# Patient Record
Sex: MALE | Race: WHITE | Employment: OTHER | URBAN - NONMETROPOLITAN AREA
[De-identification: names, ages, dates, MRNs, and addresses within clinical notes are randomized per-mention and may not be internally consistent; named-entity substitution may affect disease eponyms.]

---

## 2014-05-10 LAB
ALBUMIN SERPL-MCNC: 4.3 G/DL
ALP BLD-CCNC: 64 U/L
ALT SERPL-CCNC: 25 U/L
ANION GAP SERPL CALCULATED.3IONS-SCNC: NORMAL MMOL/L
AST SERPL-CCNC: 21 U/L
AVERAGE GLUCOSE: NORMAL
BASOPHILS ABSOLUTE: NORMAL
BASOPHILS RELATIVE PERCENT: NORMAL
BILIRUB SERPL-MCNC: 0.9 MG/DL (ref 0.1–1.4)
BUN BLDV-MCNC: 27 MG/DL
CALCIUM SERPL-MCNC: 8.9 MG/DL
CHLORIDE BLD-SCNC: 107 MMOL/L
CHOLESTEROL, TOTAL: 164 MG/DL
CHOLESTEROL/HDL RATIO: 3.7
CO2: 25 MMOL/L
CREAT SERPL-MCNC: 1.2 MG/DL
EOSINOPHILS ABSOLUTE: NORMAL
EOSINOPHILS RELATIVE PERCENT: NORMAL
GFR CALCULATED: >60
GLUCOSE BLD-MCNC: 91 MG/DL
HBA1C MFR BLD: 5.7 %
HCT VFR BLD CALC: 42.3 % (ref 41–53)
HDLC SERPL-MCNC: 44 MG/DL (ref 35–70)
HEMOGLOBIN: 14.1 G/DL (ref 13.5–17.5)
LDL CHOLESTEROL CALCULATED: 89 MG/DL (ref 0–160)
LYMPHOCYTES ABSOLUTE: NORMAL
LYMPHOCYTES RELATIVE PERCENT: NORMAL
MCH RBC QN AUTO: NORMAL PG
MCHC RBC AUTO-ENTMCNC: NORMAL G/DL
MCV RBC AUTO: NORMAL FL
MONOCYTES ABSOLUTE: NORMAL
MONOCYTES RELATIVE PERCENT: NORMAL
NEUTROPHILS ABSOLUTE: NORMAL
NEUTROPHILS RELATIVE PERCENT: NORMAL
NONHDLC SERPL-MCNC: NORMAL MG/DL
PHOSPHORUS: 3.2 MG/DL
PLATELET # BLD: 253 K/ΜL
PMV BLD AUTO: NORMAL FL
POTASSIUM SERPL-SCNC: 4.7 MMOL/L
PROSTATE SPECIFIC ANTIGEN: 1.73 NG/ML
RBC # BLD: 4.15 10^6/ΜL
SODIUM BLD-SCNC: 139 MMOL/L
TOTAL PROTEIN: 6.8
TRIGL SERPL-MCNC: 157 MG/DL
VLDLC SERPL CALC-MCNC: 31 MG/DL
WBC # BLD: 7.4 10^3/ML

## 2015-05-16 LAB
ALBUMIN SERPL-MCNC: 4.6 G/DL
ALP BLD-CCNC: 62 U/L
ALT SERPL-CCNC: 21 U/L
ANION GAP SERPL CALCULATED.3IONS-SCNC: NORMAL MMOL/L
AST SERPL-CCNC: 22 U/L
AVERAGE GLUCOSE: NORMAL
BASOPHILS ABSOLUTE: NORMAL
BASOPHILS RELATIVE PERCENT: NORMAL
BILIRUB SERPL-MCNC: 0.6 MG/DL (ref 0.1–1.4)
BUN BLDV-MCNC: 28 MG/DL
CALCIUM SERPL-MCNC: 9.1 MG/DL
CHLORIDE BLD-SCNC: 105 MMOL/L
CHOLESTEROL, TOTAL: 168 MG/DL
CHOLESTEROL/HDL RATIO: 3.8
CO2: 27 MMOL/L
CREAT SERPL-MCNC: 1.35 MG/DL
EOSINOPHILS ABSOLUTE: NORMAL
EOSINOPHILS RELATIVE PERCENT: NORMAL
GFR CALCULATED: 53
GLUCOSE BLD-MCNC: 91 MG/DL
HBA1C MFR BLD: 5.9 %
HCT VFR BLD CALC: 41.2 % (ref 41–53)
HDLC SERPL-MCNC: 44 MG/DL (ref 35–70)
HEMOGLOBIN: 14.2 G/DL (ref 13.5–17.5)
LDL CHOLESTEROL CALCULATED: 101 MG/DL (ref 0–160)
LYMPHOCYTES ABSOLUTE: NORMAL
LYMPHOCYTES RELATIVE PERCENT: NORMAL
MCH RBC QN AUTO: NORMAL PG
MCHC RBC AUTO-ENTMCNC: NORMAL G/DL
MCV RBC AUTO: NORMAL FL
MONOCYTES ABSOLUTE: NORMAL
MONOCYTES RELATIVE PERCENT: NORMAL
NEUTROPHILS ABSOLUTE: NORMAL
NEUTROPHILS RELATIVE PERCENT: NORMAL
NONHDLC SERPL-MCNC: NORMAL MG/DL
PHOSPHORUS: 3.4 MG/DL
PLATELET # BLD: 238 K/ΜL
PMV BLD AUTO: NORMAL FL
POTASSIUM SERPL-SCNC: 4.3 MMOL/L
PROSTATE SPECIFIC ANTIGEN: 2.03 NG/ML
RBC # BLD: 4.02 10^6/ΜL
SODIUM BLD-SCNC: 139 MMOL/L
TOTAL PROTEIN: 6.3
TRIGL SERPL-MCNC: 113 MG/DL
VLDLC SERPL CALC-MCNC: 22 MG/DL
WBC # BLD: 7.6 10^3/ML

## 2016-05-14 LAB
ALBUMIN SERPL-MCNC: 4.3 G/DL
ALP BLD-CCNC: 61 U/L
ALT SERPL-CCNC: 23 U/L
ANION GAP SERPL CALCULATED.3IONS-SCNC: NORMAL MMOL/L
AST SERPL-CCNC: 24 U/L
AVERAGE GLUCOSE: NORMAL
BASOPHILS ABSOLUTE: NORMAL
BASOPHILS RELATIVE PERCENT: NORMAL
BILIRUB SERPL-MCNC: 0.7 MG/DL (ref 0.1–1.4)
BUN BLDV-MCNC: 25 MG/DL
CALCIUM SERPL-MCNC: 9.2 MG/DL
CHLORIDE BLD-SCNC: 108 MMOL/L
CHOLESTEROL, TOTAL: 158 MG/DL
CHOLESTEROL/HDL RATIO: 3.5
CO2: 26 MMOL/L
CREAT SERPL-MCNC: 1.21 MG/DL
EOSINOPHILS ABSOLUTE: NORMAL
EOSINOPHILS RELATIVE PERCENT: NORMAL
GFR CALCULATED: 59
GLUCOSE BLD-MCNC: 99 MG/DL
HBA1C MFR BLD: 5.7 %
HCT VFR BLD CALC: 43.7 % (ref 41–53)
HDLC SERPL-MCNC: 44 MG/DL (ref 35–70)
HEMOGLOBIN: 14.3 G/DL (ref 13.5–17.5)
LDL CHOLESTEROL CALCULATED: 93 MG/DL (ref 0–160)
LYMPHOCYTES ABSOLUTE: NORMAL
LYMPHOCYTES RELATIVE PERCENT: NORMAL
MCH RBC QN AUTO: NORMAL PG
MCHC RBC AUTO-ENTMCNC: NORMAL G/DL
MCV RBC AUTO: NORMAL FL
MONOCYTES ABSOLUTE: NORMAL
MONOCYTES RELATIVE PERCENT: NORMAL
NEUTROPHILS ABSOLUTE: NORMAL
NEUTROPHILS RELATIVE PERCENT: NORMAL
NONHDLC SERPL-MCNC: NORMAL MG/DL
PHOSPHORUS: 3.3 MG/DL
PLATELET # BLD: 276 K/ΜL
PMV BLD AUTO: NORMAL FL
POTASSIUM SERPL-SCNC: 4.3 MMOL/L
PROSTATE SPECIFIC ANTIGEN: 1.21 NG/ML
RBC # BLD: 4.23 10^6/ΜL
SODIUM BLD-SCNC: 139 MMOL/L
TOTAL PROTEIN: 6.7
TRIGL SERPL-MCNC: 105 MG/DL
VLDLC SERPL CALC-MCNC: 21 MG/DL
WBC # BLD: 6.3 10^3/ML

## 2017-08-29 LAB
BASOPHILS ABSOLUTE: ABNORMAL
BASOPHILS RELATIVE PERCENT: ABNORMAL
BUN BLDV-MCNC: 21 MG/DL
CALCIUM SERPL-MCNC: 8.8 MG/DL
CHLORIDE BLD-SCNC: 105 MMOL/L
CO2: 28 MMOL/L
CREAT SERPL-MCNC: 1.33 MG/DL
EOSINOPHILS ABSOLUTE: ABNORMAL
EOSINOPHILS RELATIVE PERCENT: ABNORMAL
GFR CALCULATED: 53
GLUCOSE BLD-MCNC: 103 MG/DL
HCT VFR BLD CALC: 40.7 % (ref 41–53)
HEMOGLOBIN: 13.3 G/DL (ref 13.5–17.5)
LYMPHOCYTES ABSOLUTE: ABNORMAL
LYMPHOCYTES RELATIVE PERCENT: ABNORMAL
MCH RBC QN AUTO: ABNORMAL PG
MCHC RBC AUTO-ENTMCNC: ABNORMAL G/DL
MCV RBC AUTO: ABNORMAL FL
MONOCYTES ABSOLUTE: ABNORMAL
MONOCYTES RELATIVE PERCENT: ABNORMAL
NEUTROPHILS ABSOLUTE: ABNORMAL
NEUTROPHILS RELATIVE PERCENT: ABNORMAL
PLATELET # BLD: 174 K/ΜL
PMV BLD AUTO: ABNORMAL FL
POTASSIUM SERPL-SCNC: 4.4 MMOL/L
RBC # BLD: 4.06 10^6/ΜL
SODIUM BLD-SCNC: 137 MMOL/L
WBC # BLD: 7.7 10^3/ML

## 2018-09-05 ENCOUNTER — HOSPITAL ENCOUNTER (OUTPATIENT)
Dept: AUDIOLOGY | Age: 73
Discharge: HOME | End: 2018-09-05
Payer: MEDICARE

## 2018-09-05 PROCEDURE — 92557 COMPREHENSIVE HEARING TEST: CPT | Performed by: AUDIOLOGIST

## 2018-09-05 PROCEDURE — 92567 TYMPANOMETRY: CPT | Performed by: AUDIOLOGIST

## 2018-09-05 NOTE — LETTER
[]   [] Non-Patent PE tube  []   [] Could Not Test    COMMENTS:  Audiometric results indicate a moderate sloping to profound sensorineural hearing loss bilaterally. Word recognition is fair bilaterally. Tympanometry is WNL for both ears. RECOMMENDATION(S):   1. Patient to follow up with Dr. Mona Early. 2.  Patient receives hearing aid services through [facility]. Recommend annual audiometric testing and hearing aid checks. Kathe Cabot, M.S. James Mahoney.   Audiologist

## 2018-09-05 NOTE — PROGRESS NOTES
ACCOUNT #: [de-identified]      AUDIOLOGICAL EVALUATION      REASON FOR TESTING:   Patient wears binaural Rafa DAYNA hearing aids. He has experienced hearing loss for many years. Patient reported that his hearing recently declined in his right ear. He had his hearing aids adjusted by [facility]. Patient was a  and was exposed to loud road and wind noise for many years. Patient is also scheduled for imaging. OTOSCOPY: WNL     AUDIOGRAM        Reliability: good  Audiometer Used:  GSI-61    PURE TONES     RE    LE     []   [] WNL        []   [] Mild    []   [] Moderate       [x]   [x] Mod-Severe   []   [] Severe    []   [] Profound    TYPE     RE    LE    [x]   [x] SNHL    []   []  Conductive HL    []   [] Mixed HL      CONFIGURATION    RE    LE    []   [] Essentially Flat     [x]   [x]  Sloping  []   [] Steeply Sloping  []   []  Rising  []   [] Cookie Bite    SPEECH AUDIOMETRY   Right Left Sound Field Aided   PTA 72 72     SRT 65 60     SAT       MASKING       % WRS   QUIET 72 72      20 SL 20 SL     %WRS   NOISE              [facility]            Live Voice  [x]     Recorded  []     List   []     WORD RECOGNITION   RE    LE  []   []  Excellent    []   []  Good  [x]   [x] Fair  []   [] Poor  []   [] Very Poor    TYMPANOGRAMS  RE    LE  [x]   [x]  WNL    []   []  WNL w/reduced mobility  []   [] WNL w/hyper mobility  []   [] Negative pressure  []   [] Flat w/normal ECV  []   [] Flat w/large ECV  []   [] Patent PE tube  []   [] Non-Patent PE tube  []   [] Could Not Test    COMMENTS:  Audiometric results indicate a moderate sloping to profound sensorineural hearing loss bilaterally. Word recognition is fair bilaterally. Tympanometry is WNL for both ears. RECOMMENDATION(S):   1. Patient to follow up with Dr. Teresa Amaro. 2.  Patient receives hearing aid services through [facility]. Recommend annual audiometric testing and hearing aid checks.

## 2018-09-07 ENCOUNTER — HOSPITAL ENCOUNTER (OUTPATIENT)
Dept: MRI IMAGING | Age: 73
Discharge: HOME | End: 2018-09-07
Payer: MEDICARE

## 2018-09-07 DIAGNOSIS — H91.8X9 ASYMMETRICAL HEARING LOSS, UNSPECIFIED LATERALITY: ICD-10-CM

## 2018-09-07 LAB
CREATININE, WHOLE BLOOD: 1.5 MG/DL (ref 0.5–1.2)
ESTIMATED GFR, PCACC: 49 ML/MIN/1.73M2

## 2018-09-07 PROCEDURE — A9579 GAD-BASE MR CONTRAST NOS,1ML: HCPCS | Performed by: PHYSICIAN ASSISTANT

## 2018-09-07 PROCEDURE — 70553 MRI BRAIN STEM W/O & W/DYE: CPT

## 2018-09-07 PROCEDURE — 82565 ASSAY OF CREATININE: CPT

## 2018-09-07 PROCEDURE — 6360000004 HC RX CONTRAST MEDICATION: Performed by: PHYSICIAN ASSISTANT

## 2018-09-07 RX ADMIN — GADOTERIDOL 20 ML: 279.3 INJECTION, SOLUTION INTRAVENOUS at 13:25

## 2020-06-18 LAB
ALBUMIN SERPL-MCNC: 3.8 G/DL
ALP BLD-CCNC: NORMAL U/L
ALT SERPL-CCNC: 17 U/L
ANION GAP SERPL CALCULATED.3IONS-SCNC: 3 MMOL/L
AST SERPL-CCNC: 19 U/L
BASOPHILS ABSOLUTE: ABNORMAL
BASOPHILS RELATIVE PERCENT: ABNORMAL
BILIRUB SERPL-MCNC: 0.6 MG/DL (ref 0.1–1.4)
BUN BLDV-MCNC: 23 MG/DL
CALCIUM SERPL-MCNC: 9.2 MG/DL
CHLORIDE BLD-SCNC: 107 MMOL/L
CHOLESTEROL, TOTAL: 145 MG/DL
CHOLESTEROL/HDL RATIO: 4
CO2: 27 MMOL/L
CREAT SERPL-MCNC: 1.2 MG/DL
EOSINOPHILS ABSOLUTE: ABNORMAL
EOSINOPHILS RELATIVE PERCENT: ABNORMAL
GFR CALCULATED: 59
GLUCOSE BLD-MCNC: 83 MG/DL
HCT VFR BLD CALC: 40.5 % (ref 41–53)
HDLC SERPL-MCNC: 36 MG/DL (ref 35–70)
HEMOGLOBIN: 13.5 G/DL (ref 13.5–17.5)
LDL CHOLESTEROL CALCULATED: 75 MG/DL (ref 0–160)
LYMPHOCYTES ABSOLUTE: ABNORMAL
LYMPHOCYTES RELATIVE PERCENT: ABNORMAL
MCH RBC QN AUTO: ABNORMAL PG
MCHC RBC AUTO-ENTMCNC: ABNORMAL G/DL
MCV RBC AUTO: ABNORMAL FL
MONOCYTES ABSOLUTE: ABNORMAL
MONOCYTES RELATIVE PERCENT: ABNORMAL
NEUTROPHILS ABSOLUTE: ABNORMAL
NEUTROPHILS RELATIVE PERCENT: ABNORMAL
NONHDLC SERPL-MCNC: NORMAL MG/DL
PLATELET # BLD: 230 K/ΜL
PMV BLD AUTO: ABNORMAL FL
POTASSIUM SERPL-SCNC: 4.2 MMOL/L
RBC # BLD: 3.99 10^6/ΜL
SODIUM BLD-SCNC: 137 MMOL/L
TOTAL PROTEIN: 6.2
TRIGL SERPL-MCNC: 172 MG/DL
VLDLC SERPL CALC-MCNC: 34 MG/DL
WBC # BLD: 11.1 10^3/ML

## 2020-07-28 LAB
ALBUMIN SERPL-MCNC: 4.1 G/DL
ALP BLD-CCNC: NORMAL U/L
ALT SERPL-CCNC: 15 U/L
ANION GAP SERPL CALCULATED.3IONS-SCNC: 5 MMOL/L
AST SERPL-CCNC: 20 U/L
BASOPHILS ABSOLUTE: ABNORMAL
BASOPHILS RELATIVE PERCENT: ABNORMAL
BILIRUB SERPL-MCNC: 0.5 MG/DL (ref 0.1–1.4)
BUN BLDV-MCNC: 26 MG/DL
CALCIUM SERPL-MCNC: 8.7 MG/DL
CHLORIDE BLD-SCNC: 108 MMOL/L
CHOLESTEROL, TOTAL: 151 MG/DL
CHOLESTEROL/HDL RATIO: 4.3
CO2: 26 MMOL/L
CREAT SERPL-MCNC: 1.4 MG/DL
EOSINOPHILS ABSOLUTE: ABNORMAL
EOSINOPHILS RELATIVE PERCENT: ABNORMAL
GFR CALCULATED: 50
GLUCOSE BLD-MCNC: 98 MG/DL
HCT VFR BLD CALC: 40.3 % (ref 41–53)
HDLC SERPL-MCNC: 35 MG/DL (ref 35–70)
HEMOGLOBIN: 13.6 G/DL (ref 13.5–17.5)
LDL CHOLESTEROL CALCULATED: 73 MG/DL (ref 0–160)
LYMPHOCYTES ABSOLUTE: ABNORMAL
LYMPHOCYTES RELATIVE PERCENT: ABNORMAL
MCH RBC QN AUTO: ABNORMAL PG
MCHC RBC AUTO-ENTMCNC: ABNORMAL G/DL
MCV RBC AUTO: ABNORMAL FL
MONOCYTES ABSOLUTE: ABNORMAL
MONOCYTES RELATIVE PERCENT: ABNORMAL
NEUTROPHILS ABSOLUTE: ABNORMAL
NEUTROPHILS RELATIVE PERCENT: ABNORMAL
NONHDLC SERPL-MCNC: NORMAL MG/DL
PLATELET # BLD: 225 K/ΜL
PMV BLD AUTO: ABNORMAL FL
POTASSIUM SERPL-SCNC: 4.6 MMOL/L
RBC # BLD: 3.97 10^6/ΜL
SODIUM BLD-SCNC: 139 MMOL/L
TOTAL PROTEIN: 6.5
TRIGL SERPL-MCNC: 162 MG/DL
VLDLC SERPL CALC-MCNC: 32 MG/DL
WBC # BLD: 10.2 10^3/ML

## 2020-09-10 RX ORDER — SIMVASTATIN 20 MG
20 TABLET ORAL NIGHTLY
COMMUNITY

## 2020-09-10 RX ORDER — OLMESARTAN MEDOXOMIL 20 MG/1
20 TABLET ORAL DAILY
COMMUNITY

## 2020-09-14 LAB
AVERAGE GLUCOSE: NORMAL
BUN BLDV-MCNC: 30 MG/DL
CALCIUM SERPL-MCNC: 9 MG/DL
CHLORIDE BLD-SCNC: 108 MMOL/L
CO2: 26 MMOL/L
CREAT SERPL-MCNC: 1.45 MG/DL
GFR CALCULATED: 48
GLUCOSE BLD-MCNC: 94 MG/DL
HBA1C MFR BLD: 6 %
POTASSIUM SERPL-SCNC: 4.9 MMOL/L
PROSTATE SPECIFIC ANTIGEN: 2.28 NG/ML
SODIUM BLD-SCNC: 139 MMOL/L

## 2020-09-18 ENCOUNTER — HOSPITAL ENCOUNTER (OUTPATIENT)
Age: 75
Setting detail: OUTPATIENT SURGERY
Discharge: HOME | End: 2020-09-18
Attending: SPECIALIST | Admitting: SPECIALIST
Payer: MEDICARE

## 2020-09-18 VITALS
RESPIRATION RATE: 16 BRPM | HEIGHT: 72 IN | TEMPERATURE: 97.5 F | OXYGEN SATURATION: 98 % | DIASTOLIC BLOOD PRESSURE: 63 MMHG | SYSTOLIC BLOOD PRESSURE: 132 MMHG | BODY MASS INDEX: 25.98 KG/M2 | HEART RATE: 55 BPM | WEIGHT: 191.8 LBS

## 2020-09-18 PROCEDURE — 2709999900 HC NON-CHARGEABLE SUPPLY: Performed by: SPECIALIST

## 2020-09-18 PROCEDURE — 2500000003 HC RX 250 WO HCPCS: Performed by: SPECIALIST

## 2020-09-18 PROCEDURE — 88305 TISSUE EXAM BY PATHOLOGIST: CPT

## 2020-09-18 PROCEDURE — 3600000012 HC SURGERY LEVEL 2 ADDTL 15MIN: Performed by: SPECIALIST

## 2020-09-18 PROCEDURE — 3600000002 HC SURGERY LEVEL 2 BASE: Performed by: SPECIALIST

## 2020-09-18 PROCEDURE — 88331 PATH CONSLTJ SURG 1 BLK 1SPC: CPT

## 2020-09-18 PROCEDURE — 7100000010 HC PHASE II RECOVERY - FIRST 15 MIN: Performed by: SPECIALIST

## 2020-09-18 PROCEDURE — 7100000011 HC PHASE II RECOVERY - ADDTL 15 MIN: Performed by: SPECIALIST

## 2020-09-18 RX ORDER — CEFADROXIL 500 MG/1
500 CAPSULE ORAL 2 TIMES DAILY
COMMUNITY
End: 2020-09-24

## 2020-09-18 RX ORDER — LIDOCAINE HYDROCHLORIDE AND EPINEPHRINE 10; 10 MG/ML; UG/ML
INJECTION, SOLUTION INFILTRATION; PERINEURAL PRN
Status: DISCONTINUED | OUTPATIENT
Start: 2020-09-18 | End: 2020-09-18 | Stop reason: ALTCHOICE

## 2020-09-18 ASSESSMENT — PAIN - FUNCTIONAL ASSESSMENT: PAIN_FUNCTIONAL_ASSESSMENT: 0-10

## 2020-09-18 ASSESSMENT — PAIN SCALES - GENERAL: PAINLEVEL_OUTOF10: 0

## 2020-09-18 NOTE — PROGRESS NOTES
1244: Patient to phase 2 recovery room via transport chair. Patient is awake and alert. Report received from surgical RN, Corey Umaña. Patient's vitals obtained, see charting. 1246: Patient is denying pain and nausea at this time. Dressing to his lower back is clean, dry and intact- see LDA. 1248: Offered water given. Patient is getting dressed at this time. Wife is at the bedside. 1259: Discharge instructions given and explained to the patient and the patient's wife, both verbalized understanding. 1301: Patient ambulated to the car and discharged home in stable condition with his wife.

## 2020-09-18 NOTE — H&P
[address] [address]  History and Physical Update    Pt Name: Juan C Ray  MRN: [id number]  YOB: 1945  Date of evaluation: 9/18/2020    I have examined the patient and reviewed the H&P/Consult and there are no changes to the patient or plans.       Raul Larios  Electronically signed 9/18/2020 at 6:54 AM

## 2020-09-18 NOTE — PROGRESS NOTES
[address], [facility], 4X4 GAUZE & FOAM TAPE APPLIED TO LOWER BACK INCISION.

## 2020-09-18 NOTE — OP NOTE
Operative Note    Patient name: Jayson Chopra             Medical Record Number: [id number]    Primary Care Physician: Zaki Campbell     1945    Date of Procedure: 2020    Pre-operative Diagnosis: 1.5cm left lower back basal cell carcinoma    Post-operative Diagnosis: Same    Procedure Performed: 3cm excision of left lower back basal cell carcinoma with 5cm complex closure (CPT 22074 & 44087)    Surgeons/Assistants: Dr. Hunter Thompson PA-C    Estimated Blood Loss: 3ml     Complications: none immediately appreciated    Procedure: With the patient lying in the prone position after having anesthetized the area with a total of 19 ml of 1% Lidocaine 1:100,000 with epinephrine solution, the area was then prepped  draped in the standard surgical fashion. The skin cancer was excised and sent for fresh frozen evaluation. Pathology called back to the room and stated indeed the margin were clear. This was extensively undermined due to tension and then closed in layers with 0 PDS, 3-0 Monocryl suture placed in interrupted buried fashion. Final layer was skin staples, bacitracin and bulky sterile dressings. The patient tolerated the procedure quite well and remained hemodynamically stable throughout the procedure and was quite comfortable throughout the operative course.     Clinical staging for cancer cases:  Ct  Cn  Cm    María Elena Looney  Electronically signed by me on 2020 at 12:38 PMOperative Note      Patient: Jayson Chopra  YOB: 1945  MRN: [id number]    Date of Procedure: 2020    Pre-Op Diagnosis: BCC LEFT LOWER BACK    Post-Op Diagnosis: Same       Procedure(s):  EXCISION BCC LEFT LOWER BACK WITH FROZEN SECTION    Surgeon(s):  María Elena Looney MD    Assistant:   Physician Assistant: Karina Jameson PA-C    Anesthesia: Local    Estimated Blood Loss (mL): Minimal    Complications: None    Specimens:   ID Type Source Tests Collected by Time Destination   A : BCC LEFT LOWER BACK, SOLID TYPE, PRESENT IN EDGE OF BIOPSY, SHORT STITCH SUPERIOR, LONG STITCH LATERAL Tissue Back SURGICAL PATHOLOGY Neftaly Rubio MD 9/18/2020 1137        Implants:  * No implants in log *      Drains: * No LDAs found *    Findings: 1.5cm left lower back basal cell carcinoma      Detailed Description of Procedure:   3cm excision of left lower back basal cell carcinoma with 5cm complex closure (CPT 07291 & 08274)      Electronically signed by Neftaly Rubio MD on 9/18/2020 at 12:35 PM

## 2020-10-13 ENCOUNTER — OFFICE VISIT (OUTPATIENT)
Dept: NEPHROLOGY | Age: 75
End: 2020-10-13
Payer: MEDICARE

## 2020-10-13 VITALS
WEIGHT: 197 LBS | DIASTOLIC BLOOD PRESSURE: 64 MMHG | OXYGEN SATURATION: 97 % | SYSTOLIC BLOOD PRESSURE: 133 MMHG | HEART RATE: 67 BPM | BODY MASS INDEX: 26.72 KG/M2

## 2020-10-13 PROCEDURE — G8427 DOCREV CUR MEDS BY ELIG CLIN: HCPCS | Performed by: INTERNAL MEDICINE

## 2020-10-13 PROCEDURE — 1123F ACP DISCUSS/DSCN MKR DOCD: CPT | Performed by: INTERNAL MEDICINE

## 2020-10-13 PROCEDURE — G8484 FLU IMMUNIZE NO ADMIN: HCPCS | Performed by: INTERNAL MEDICINE

## 2020-10-13 PROCEDURE — 4040F PNEUMOC VAC/ADMIN/RCVD: CPT | Performed by: INTERNAL MEDICINE

## 2020-10-13 PROCEDURE — G8419 CALC BMI OUT NRM PARAM NOF/U: HCPCS | Performed by: INTERNAL MEDICINE

## 2020-10-13 PROCEDURE — 3017F COLORECTAL CA SCREEN DOC REV: CPT | Performed by: INTERNAL MEDICINE

## 2020-10-13 PROCEDURE — 1036F TOBACCO NON-USER: CPT | Performed by: INTERNAL MEDICINE

## 2020-10-13 PROCEDURE — 99204 OFFICE O/P NEW MOD 45 MIN: CPT | Performed by: INTERNAL MEDICINE

## 2020-10-13 NOTE — PATIENT INSTRUCTIONS
Drugs to Avoid with Chronic Kidney Disease    Non-steroidal anti-inflammatory drugs (NSAIDS)    Potential complication and side effects of NSAIDS include:   Kidney injury and worsening kidney function    Risk of stomach ulcer and intestinal bleeding   Fluid retention and edema   Increased Blood Pressure    Non-Steroidal Anti-Inflammatory Drugs     Aspirin (Anacin, Ascriptin, eliezer, Bufferin, Ecotrin, Excedrin)   Celecoxib (Celebrex)   Choline and magnesium salicylates (CMT, Trisosal, Trilisate)   Choline salicylate (Arthropan)   Diclofenac (Voltaren, Arthrotec, Cataflam, Cambia, Voltaren-XR, Zipsor)   Diflunisal (Dolobid)   Etodolac (Lodine XL, Lodine)   Famotidine + Ibuprofen (Duexis)   Fenoprofen (Nalfon, Nalfon 200)   Furbiprofen (Asaid)   Ibuprofen (Advil, Motrin, Midol, Nuprin, Genpril, Dolgesic,Profen,, Vicoprofen,Combunox, Actiprofen, Addaprin, Caldolor, Haltran, Q-Profen,Ibren, Menadol, Rufen,Saleto-200, [place],Ultraprin, Uni-Pro,Wal-Profen)   Indomethacin (Indocin, Indomethegan, Indo-Sid)    Ketoprofen (Orudis  KT, Oruvail, Actron)   Ketorolac (Toradol, Sprix)   Magnesium Sulfate (Arthritab, Eliezer Select, Nawaf's pills, Ameya, Mobidin, Mobogesic)   Meclofenamate Sodium (Ponstel)   Meloxicam (Mobic)   Naproxen (Aleve, Anaprox, Naprosyn, EC-Naprosyn, Naprelan, All Day Pain Relief, Aflaxen, Anaprox-DS, Midol Extended Relief, Naprelan,Prevacid NapraPac, Naprapac, Vimovo)   Nabumetone (Relafen)   Oxaprozin (Daypro)   Piroxicam (Feldene)   Rofecoxib (Vioxx)   Salsalate (Amigesic, Anaflex 750, Disalcid, Marthritic, Mono-gesic, Salflex, Salsitab)   Sodium salicylate (various generics)   Sulindac (Clinoril)   Tolmetin (Tolectin)   Valdecoxib (Bextra)   Mefenamic Acid (Ponstel)   Famotidine and Ibuprofen (Duexis)   Meclofenamate (Meclomen)    Antibiotics   Bactrim   Gentamycin   Tobramycin   Vancomycin   Tetracycline   Macrobid     Other                  IV contrast dye

## 2020-10-13 NOTE — PROGRESS NOTES
Nephrology Consult Note  Patient's Name: Sanaz Caballero  8:25 AM  10/13/2020    Nephrologist: Rahat Bender    Reason for Consult: Chronic kidney disease  Requesting Physician:  No att. providers found  PCP: Av Duron    Chief Complaint: My kidney numbers are high  Assessment   Diagnosis Orders   1. Stage 3b chronic kidney disease  Basic Metabolic Panel    Vitamin D 25 Hydroxy    PTH, Intact    Protein / creatinine ratio, urine    US RENAL LIMITED   2. Essential hypertension     3. Dyslipidemia           Plan    1. I discussed my thoughts with the patient and spouse. 2. They understood. 3. Addressed their questions. 4. He likely has chronic kidney disease due to hypertensive nephrosclerosis. However, differential diagnosis will include obstructive uropathy from BPH due to his advanced age. I doubt this is the case. 5. We will get baseline kidney ultrasound to also rule out hydronephrosis  6. Check markers of chronicity  7. Random urine protein creatinine ratio  8. Avoid nonsteroidal anti-inflammatory drugs. 9. List provided to the patient from the office. 10. Return visit in 4 weeks with labs. History Obtained From:  Patient,staff and medical records  History of Present Ilness:    Sanaz Caballreo is a 69 y.o. male with history of hypertension, dyslipidemia who was referred to our office because of elevated  serum creatinine level. In May 2015, serum creatinine was 1.35 mg/L. In August 2017 it was 1.33 mg/dL. In July 2020 serum creatinine was 1.40 mg/L. Repeat serum creatinine was September 14, 2020 increased to 1.45 mg/dL. As a result he was asked to see us. No difficulties with urination. He does report urinary frequency however without burning. He denies taking any nonsteroidal anti-inflammatory medications. 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 We cannot confirm that with any diagnostic studies as there is none on the record.   He was followed by Dr. Polina Randolph many years ago before he retired. Denies chest pain,shortness of breath,fever,chills,nausea or vomiting     Past Medical History:   Diagnosis Date    BPH (benign prostatic hyperplasia)     Hyperlipidemia     Hyperlipidemia     Hypertension        Past Surgical History:   Procedure Laterality Date    BACK SURGERY Left 9/18/2020    EXCISION BCC LEFT LOWER BACK WITH FROZEN SECTION performed by Antonio Eastman MD at [address] [place] [address]         History reviewed. No pertinent family history. reports that he has never smoked. He has never used smokeless tobacco. He reports previous alcohol use. He reports that he does not use drugs. Allergies:  Patient has no known allergies. Current Medications:    Current Outpatient Medications   Medication Sig Dispense Refill    simvastatin (ZOCOR) 20 MG tablet Take 20 mg by mouth nightly      olmesartan (BENICAR) 20 MG tablet Take 20 mg by mouth daily       No current facility-administered medications for this visit. No current facility-administered medications for this visit. Review of Systems:   Pertinent positives stated above in HPI. All other systems were reviewed and were negative. ROS:Constitutional: negative  Eyes: negative  Ears, nose, mouth, throat, and face: negative  Respiratory: negative  Cardiovascular: negative  Gastrointestinal: negative  Genitourinary:negative  Integument/breast: negative  Hematologic/lymphatic: negative  Musculoskeletal:negative  Neurological: negative  Behavioral/Psych: negative  Endocrine: negative  Allergic/Immunologic: negative    Physical exam:   Constitutional: Well-developed elderly gentleman in no obvious distress. Vitals:   Vitals:    10/13/20 0822   BP: 133/64   Pulse: 67   SpO2: 97%       Skin: no rash, turgor is normal  Heent: Pupils are reactive to light. Throat is clear.   Oral mucosa is moist.  Neck: no bruits or jvd noted **  Cardiovascular:  S1, S2 without murmur

## 2020-10-21 ENCOUNTER — TELEPHONE (OUTPATIENT)
Dept: NEPHROLOGY | Age: 75
End: 2020-10-21

## 2020-10-21 ENCOUNTER — HOSPITAL ENCOUNTER (OUTPATIENT)
Dept: ULTRASOUND IMAGING | Age: 75
Discharge: HOME | End: 2020-10-21
Payer: MEDICARE

## 2020-10-21 PROCEDURE — 76770 US EXAM ABDO BACK WALL COMP: CPT

## 2020-10-21 NOTE — TELEPHONE ENCOUNTER
----- Message from Sahra Pritchard MD sent at 10/21/2020  4:43 PM EDT -----  Only one kidney seen the right kidney which is normal otherwise

## 2020-11-05 ENCOUNTER — HOSPITAL ENCOUNTER (OUTPATIENT)
Age: 75
Discharge: HOME | End: 2020-11-05
Payer: MEDICARE

## 2020-11-05 DIAGNOSIS — N18.32 STAGE 3B CHRONIC KIDNEY DISEASE (HCC): ICD-10-CM

## 2020-11-05 LAB
ANION GAP SERPL CALCULATED.3IONS-SCNC: 7 MEQ/L (ref 8–16)
BUN BLDV-MCNC: 27 MG/DL (ref 7–22)
CALCIUM SERPL-MCNC: 9.6 MG/DL (ref 8.5–10.5)
CHLORIDE BLD-SCNC: 105 MEQ/L (ref 98–111)
CO2: 29 MEQ/L (ref 23–33)
CREAT SERPL-MCNC: 1.4 MG/DL (ref 0.4–1.2)
CREATININE URINE: 109.2 MG/DL
GFR SERPL CREATININE-BSD FRML MDRD: 50 ML/MIN/1.73M2
GLUCOSE BLD-MCNC: 100 MG/DL (ref 70–108)
POTASSIUM SERPL-SCNC: 4.8 MEQ/L (ref 3.5–5.2)
PROT/CREAT RATIO, UR: 0.07
PROTEIN, URINE: 7.7 MG/DL
PTH INTACT: 34.3 PG/ML (ref 15–65)
SODIUM BLD-SCNC: 141 MEQ/L (ref 135–145)
VITAMIN D 25-HYDROXY: 35 NG/ML (ref 30–100)

## 2020-11-05 PROCEDURE — 36415 COLL VENOUS BLD VENIPUNCTURE: CPT

## 2020-11-05 PROCEDURE — 82570 ASSAY OF URINE CREATININE: CPT

## 2020-11-05 PROCEDURE — 84156 ASSAY OF PROTEIN URINE: CPT

## 2020-11-05 PROCEDURE — 82306 VITAMIN D 25 HYDROXY: CPT

## 2020-11-05 PROCEDURE — 80048 BASIC METABOLIC PNL TOTAL CA: CPT

## 2020-11-05 PROCEDURE — 83970 ASSAY OF PARATHORMONE: CPT

## 2020-11-11 ENCOUNTER — OFFICE VISIT (OUTPATIENT)
Dept: NEPHROLOGY | Age: 75
End: 2020-11-11
Payer: MEDICARE

## 2020-11-11 VITALS
WEIGHT: 195 LBS | TEMPERATURE: 97.7 F | DIASTOLIC BLOOD PRESSURE: 78 MMHG | HEART RATE: 59 BPM | BODY MASS INDEX: 26.45 KG/M2 | SYSTOLIC BLOOD PRESSURE: 132 MMHG | OXYGEN SATURATION: 98 %

## 2020-11-11 PROCEDURE — 4040F PNEUMOC VAC/ADMIN/RCVD: CPT | Performed by: INTERNAL MEDICINE

## 2020-11-11 PROCEDURE — 3017F COLORECTAL CA SCREEN DOC REV: CPT | Performed by: INTERNAL MEDICINE

## 2020-11-11 PROCEDURE — G8417 CALC BMI ABV UP PARAM F/U: HCPCS | Performed by: INTERNAL MEDICINE

## 2020-11-11 PROCEDURE — 99213 OFFICE O/P EST LOW 20 MIN: CPT | Performed by: INTERNAL MEDICINE

## 2020-11-11 PROCEDURE — G8427 DOCREV CUR MEDS BY ELIG CLIN: HCPCS | Performed by: INTERNAL MEDICINE

## 2020-11-11 PROCEDURE — 1036F TOBACCO NON-USER: CPT | Performed by: INTERNAL MEDICINE

## 2020-11-11 PROCEDURE — 1123F ACP DISCUSS/DSCN MKR DOCD: CPT | Performed by: INTERNAL MEDICINE

## 2020-11-11 PROCEDURE — G8484 FLU IMMUNIZE NO ADMIN: HCPCS | Performed by: INTERNAL MEDICINE

## 2020-11-11 NOTE — PROGRESS NOTES
Renal Progress Note    Assessment and Plan:      Diagnosis Orders   1. Stage 3b chronic kidney disease     2. Essential hypertension     3. Solitary right kidney     4. Acquired renal cyst of right kidney     PLAN:  Discussed lab results with patient and spouse. They understood. I addressed their questions. Serum creatinine is slightly improved to 1.4 mg/dL from 1.45 mg/dL. PTH is normal.  Vitamin D level is normal.  Random urine protein creatinine ratio is normal at 70 mg. Kidney ultrasound shows a solitary right kidney with compensatory hypertrophy. This was discussed with him. Medications reviewed. No changes. I did offer to give him medication for nocturnal frequency but he declined. Return visit in 6 months with labs. There is no problem list on file for this patient. Subjective:   Chief complaint:  Chief Complaint   Patient presents with    Chronic Kidney Disease     Stage IIIB      HPI:This is a follow up visit for Mr. Jesse Russo who is here today for return appointment. .  I saw him about 4 weeks ago for elevated serum creatinine level. Doing well with no complaint. No chest pain or shortness of breath. No fever or chills. No nausea vomiting. No headaches. No abdominal pain. Appetite is good. The wife complains of nocturnal frequency.   Patient himself does not think it is that bad    ROS:Constitutional: negative  Eyes: negative  Ears, nose, mouth, throat, and face: negative  Respiratory: negative  Cardiovascular: negative  Gastrointestinal: negative  Genitourinary:positive for frequency  Integument/breast: negative  Hematologic/lymphatic: negative  Musculoskeletal:negative  Neurological: negative  Behavioral/Psych: negative  Endocrine: negative  Allergic/Immunologic: negative      Medications:     Current Outpatient Medications   Medication Sig Dispense Refill    simvastatin (ZOCOR) 20 MG tablet Take 20 mg by mouth nightly      olmesartan (BENICAR) 20 MG tablet Take 20 mg by mouth daily       No current facility-administered medications for this visit. Lab Results:    CBC:   Lab Results   Component Value Date    WBC 10.2 07/28/2020    HGB 13.6 07/28/2020    HCT 40.3 (A) 07/28/2020     07/28/2020     BMP:    Lab Results   Component Value Date     11/05/2020     09/14/2020     07/28/2020    K 4.8 11/05/2020    K 4.9 09/14/2020    K 4.6 07/28/2020     11/05/2020     09/14/2020     07/28/2020    CO2 29 11/05/2020    CO2 26 09/14/2020    CO2 26 07/28/2020    BUN 27 (H) 11/05/2020    BUN 30 09/14/2020    BUN 26 07/28/2020    CREATININE 1.4 (H) 11/05/2020    CREATININE 1.45 09/14/2020    CREATININE 1.40 07/28/2020    GLUCOSE 100 11/05/2020    GLUCOSE 94 09/14/2020    GLUCOSE 98 07/28/2020      Hepatic:   Lab Results   Component Value Date    AST 20 07/28/2020    AST 19 06/18/2020    AST 24 05/14/2016    ALT 15 07/28/2020    ALT 17 06/18/2020    ALT 23 05/14/2016    BILITOT 0.5 07/28/2020    BILITOT 0.6 06/18/2020    BILITOT 0.7 05/14/2016    ALKPHOS 61 05/14/2016    ALKPHOS 62 05/16/2015    ALKPHOS 64 05/10/2014     BNP: No results found for: BNP  Lipids:   Lab Results   Component Value Date    CHOL 151 07/28/2020    HDL 35 07/28/2020     INR: No results found for: INR  URINE:   Lab Results   Component Value Date    PROTUR 7.7 11/05/2020     No results found for: NITRU, COLORU, PHUR, LABCAST, WBCUA, RBCUA, MUCUS, TRICHOMONAS, YEAST, BACTERIA, CLARITYU, SPECGRAV, LEUKOCYTESUR, UROBILINOGEN, BILIRUBINUR, BLOODU, GLUCOSEU, KETUA, AMORPHOUS   Microalbumen/Creatinine ratio:  No components found for: RUCREAT    Objective:   Vitals: /78 (Site: Right Upper Arm, Position: Sitting, Cuff Size: Large Adult)   Pulse 59   Temp 97.7 °F (36.5 °C)   Wt 195 lb (88.5 kg)   SpO2 98%   BMI 26.45 kg/m²      Constitutional:  Alert, awake, no apparent distress  Skin:normal with no rash or any lesions  HEENT:Pupils are reactive . Throat is clear.   Oral mucosa is moist.  Neck:supple with no thyromegaly or bruit   Cardiovascular:  S1, S2 without murmur   Respiratory:  Clear to auscultation with no wheezes or rales  Abdomen: +bowel sound, soft, non tender and no bruit  Ext: No LE edema  Musculoskeletal:Intact  Neuro:Alert, awake and oriented with no obvious focal deficit.   Speech is normal.    Electronically signed by Shara Maier MD on 11/11/2020 at 12:05 PM

## 2021-05-07 ENCOUNTER — HOSPITAL ENCOUNTER (OUTPATIENT)
Age: 76
Discharge: HOME | End: 2021-05-07
Payer: MEDICARE

## 2021-05-07 DIAGNOSIS — N18.32 STAGE 3B CHRONIC KIDNEY DISEASE (HCC): ICD-10-CM

## 2021-05-07 LAB
ANION GAP SERPL CALCULATED.3IONS-SCNC: 7 MEQ/L (ref 8–16)
BUN BLDV-MCNC: 24 MG/DL (ref 7–22)
CALCIUM SERPL-MCNC: 9.5 MG/DL (ref 8.5–10.5)
CHLORIDE BLD-SCNC: 108 MEQ/L (ref 98–111)
CO2: 28 MEQ/L (ref 23–33)
CREAT SERPL-MCNC: 1.5 MG/DL (ref 0.4–1.2)
GFR SERPL CREATININE-BSD FRML MDRD: 46 ML/MIN/1.73M2
GLUCOSE BLD-MCNC: 122 MG/DL (ref 70–108)
POTASSIUM SERPL-SCNC: 4.7 MEQ/L (ref 3.5–5.2)
PTH INTACT: 32.8 PG/ML (ref 15–65)
SODIUM BLD-SCNC: 143 MEQ/L (ref 135–145)
VITAMIN D 25-HYDROXY: 33 NG/ML (ref 30–100)

## 2021-05-07 PROCEDURE — 82306 VITAMIN D 25 HYDROXY: CPT

## 2021-05-07 PROCEDURE — 83970 ASSAY OF PARATHORMONE: CPT

## 2021-05-07 PROCEDURE — 80048 BASIC METABOLIC PNL TOTAL CA: CPT

## 2021-05-07 PROCEDURE — 36415 COLL VENOUS BLD VENIPUNCTURE: CPT

## 2021-05-12 ENCOUNTER — OFFICE VISIT (OUTPATIENT)
Dept: NEPHROLOGY | Age: 76
End: 2021-05-12
Payer: MEDICARE

## 2021-05-12 VITALS
TEMPERATURE: 97.8 F | DIASTOLIC BLOOD PRESSURE: 68 MMHG | BODY MASS INDEX: 26.75 KG/M2 | OXYGEN SATURATION: 98 % | WEIGHT: 197.2 LBS | SYSTOLIC BLOOD PRESSURE: 124 MMHG | HEART RATE: 60 BPM

## 2021-05-12 DIAGNOSIS — N28.1 ACQUIRED RENAL CYST OF RIGHT KIDNEY: ICD-10-CM

## 2021-05-12 DIAGNOSIS — I10 ESSENTIAL HYPERTENSION: ICD-10-CM

## 2021-05-12 DIAGNOSIS — N18.32 STAGE 3B CHRONIC KIDNEY DISEASE (HCC): Primary | ICD-10-CM

## 2021-05-12 PROCEDURE — 1036F TOBACCO NON-USER: CPT | Performed by: INTERNAL MEDICINE

## 2021-05-12 PROCEDURE — 3017F COLORECTAL CA SCREEN DOC REV: CPT | Performed by: INTERNAL MEDICINE

## 2021-05-12 PROCEDURE — G8417 CALC BMI ABV UP PARAM F/U: HCPCS | Performed by: INTERNAL MEDICINE

## 2021-05-12 PROCEDURE — 1123F ACP DISCUSS/DSCN MKR DOCD: CPT | Performed by: INTERNAL MEDICINE

## 2021-05-12 PROCEDURE — 4040F PNEUMOC VAC/ADMIN/RCVD: CPT | Performed by: INTERNAL MEDICINE

## 2021-05-12 PROCEDURE — 99212 OFFICE O/P EST SF 10 MIN: CPT | Performed by: INTERNAL MEDICINE

## 2021-05-12 PROCEDURE — G8427 DOCREV CUR MEDS BY ELIG CLIN: HCPCS | Performed by: INTERNAL MEDICINE

## 2021-05-12 NOTE — PROGRESS NOTES
Renal Progress Note    Assessment and Plan:      Diagnosis Orders   1. Stage 3b chronic kidney disease  Basic Metabolic Panel    Vitamin D 25 Hydroxy    PTH, Intact   2. Essential hypertension     3. Acquired renal cyst of right kidney     4. Solitary right kidney      PLAN:  I discussed my thoughts with the patient and spouse. They understood. Lab result discussed with them. Serum creatinine is mostly stable at 1.5 mg/dL. PTH is normal.  Vitamin D level is normal.  Medications reviewed  No changes  Continue to avoid  nonsteroidal anti-inflammatory drugs  New list provided to them  Return visit in 12 months with labs not 6 months      There is no problem list on file for this patient. Subjective:   Chief complaint:  Chief Complaint   Patient presents with    Chronic Kidney Disease     Stage IIIa      HPI:This is a follow up visit for Mr. Giselle Harmon who is here today for return appointment. I see him for chronic kidney disease. He was last seen about 6 months ago. Serum creatinine was 1.4 mg/dL. Today it is 1.5 mg/dL. Doing well since then. No new medications. No complaint. Denies any chest pain or shortness of breath. No nausea vomiting. No fever or chills. Appetite is good. No difficulties with injection. ROS:  Pertinent positives stated above in HPI. All other systems were reviewed and were negative. Medications:     Current Outpatient Medications   Medication Sig Dispense Refill    simvastatin (ZOCOR) 20 MG tablet Take 20 mg by mouth nightly      olmesartan (BENICAR) 20 MG tablet Take 20 mg by mouth daily       No current facility-administered medications for this visit.         Lab Results:    CBC:   Lab Results   Component Value Date    WBC 10.2 07/28/2020    HGB 13.6 07/28/2020    HCT 40.3 (A) 07/28/2020     07/28/2020     BMP:    Lab Results   Component Value Date     05/07/2021     11/05/2020     09/14/2020    K 4.7 05/07/2021    K 4.8 11/05/2020    K 4.9 09/14/2020     05/07/2021     11/05/2020     09/14/2020    CO2 28 05/07/2021    CO2 29 11/05/2020    CO2 26 09/14/2020    BUN 24 (H) 05/07/2021    BUN 27 (H) 11/05/2020    BUN 30 09/14/2020    CREATININE 1.5 (H) 05/07/2021    CREATININE 1.4 (H) 11/05/2020    CREATININE 1.45 09/14/2020    GLUCOSE 122 (H) 05/07/2021    GLUCOSE 100 11/05/2020    GLUCOSE 94 09/14/2020      Hepatic:   Lab Results   Component Value Date    AST 20 07/28/2020    AST 19 06/18/2020    AST 24 05/14/2016    ALT 15 07/28/2020    ALT 17 06/18/2020    ALT 23 05/14/2016    BILITOT 0.5 07/28/2020    BILITOT 0.6 06/18/2020    BILITOT 0.7 05/14/2016    ALKPHOS 61 05/14/2016    ALKPHOS 62 05/16/2015    ALKPHOS 64 05/10/2014     BNP: No results found for: BNP  Lipids:   Lab Results   Component Value Date    CHOL 151 07/28/2020    HDL 35 07/28/2020     INR: No results found for: INR  URINE:   Lab Results   Component Value Date    PROTUR 7.7 11/05/2020     No results found for: NITRU, COLORU, PHUR, LABCAST, WBCUA, RBCUA, MUCUS, TRICHOMONAS, YEAST, BACTERIA, CLARITYU, SPECGRAV, LEUKOCYTESUR, UROBILINOGEN, BILIRUBINUR, BLOODU, GLUCOSEU, KETUA, AMORPHOUS   Microalbumen/Creatinine ratio:  No components found for: RUCREAT    Objective:   Vitals: /68 (Site: Left Upper Arm, Position: Sitting, Cuff Size: Large Adult)   Pulse 60   Temp 97.8 °F (36.6 °C)   Wt 197 lb 3.2 oz (89.4 kg)   SpO2 98%   BMI 26.75 kg/m²      Constitutional:  Alert, awake, no apparent distress  Skin:normal with no rash or any lesions  HEENT:Pupils are reactive . Throat is clear. Oral mucosa is moist.  Neck:supple with no thyromegaly or bruit   Cardiovascular:  S1, S2 without murmur   Respiratory:  Clear to auscultation with no wheezes or rales  Abdomen: +bowel sound, soft, non tender and no bruit  Ext: No LE edema  Musculoskeletal:Intact  Neuro:Alert, awake and oriented with no obvious focal deficit.   Speech is normal    Electronically signed by Refugia Stamp

## 2021-05-12 NOTE — PATIENT INSTRUCTIONS
Drugs to Avoid with Chronic Kidney Disease    Non-steroidal anti-inflammatory drugs (NSAIDS)    Potential complication and side effects of NSAIDS include:   Kidney injury and worsening kidney function    Risk of stomach ulcer and intestinal bleeding   Fluid retention and edema   Increased Blood Pressure    Non-Steroidal Anti-Inflammatory Drugs     Aspirin (Anacin, Ascriptin, eliezer, Bufferin, Ecotrin, Excedrin)   Celecoxib (Celebrex)   Choline and magnesium salicylates (CMT, Trisosal, Trilisate)   Choline salicylate (Arthropan)   Diclofenac (Voltaren, Arthrotec, Cataflam, Cambia, Voltaren-XR, Zipsor)   Diflunisal (Dolobid)   Etodolac (Lodine XL, Lodine)   Famotidine + Ibuprofen (Duexis)   Fenoprofen (Nalfon, Nalfon 200)   Furbiprofen (Asaid)   Ibuprofen (Advil, Motrin, Midol, Nuprin, Genpril, Dolgesic,Profen,, Vicoprofen,Combunox, Actiprofen, Addaprin, Caldolor, Haltran, Q-Profen,Ibren, Menadol, Rufen,Saleto-200, [place],Ultraprin, Uni-Pro,Wal-Profen)   Indomethacin (Indocin, Indomethegan, Indo-Sid)    Ketoprofen (Orudis  KT, Oruvail, Actron)   Ketorolac (Toradol, Sprix)   Magnesium Sulfate (Arthritab, Eliezer Select, Nawaf's pills, Ameya, Mobidin, Mobogesic)   Meclofenamate Sodium (Ponstel)   Meloxicam (Mobic)   Naproxen (Aleve, Anaprox, Naprosyn, EC-Naprosyn, Naprelan, All Day Pain Relief, Aflaxen, Anaprox-DS, Midol Extended Relief, Naprelan,Prevacid NapraPac, Naprapac, Vimovo)   Nabumetone (Relafen)   Oxaprozin (Daypro)   Piroxicam (Feldene)   Rofecoxib (Vioxx)   Salsalate (Amigesic, Anaflex 750, Disalcid, Marthritic, Mono-gesic, Salflex, Salsitab)   Sodium salicylate (various generics)   Sulindac (Clinoril)   Tolmetin (Tolectin)   Valdecoxib (Bextra)   Mefenamic Acid (Ponstel)   Famotidine and Ibuprofen (Duexis)   Meclofenamate (Meclomen)    Antibiotics   Bactrim   Gentamycin   Tobramycin   Vancomycin   Tetracycline   Macrobid     Other                  IV contrast dye

## 2022-04-28 LAB
ANION GAP SERPL CALCULATED.3IONS-SCNC: 5 MMOL/L (ref 4–12)
BUN BLDV-MCNC: 27 MG/DL (ref 7–20)
CALCIUM SERPL-MCNC: 9.2 MG/DL (ref 8.8–10.5)
CHLORIDE BLD-SCNC: 109 MEQ/L (ref 101–111)
CO2: 27 MEQ/L (ref 21–32)
CREAT SERPL-MCNC: 1.58 MG/DL (ref 0.6–1.3)
CREATININE CLEARANCE: 43
GLUCOSE: 93 MG/DL (ref 70–110)
PARATHYROID HORMONE INTACT: 37.8 U/ML (ref 12–88)
POTASSIUM SERPL-SCNC: 4.9 MEQ/L (ref 3.6–5)
SODIUM BLD-SCNC: 141 MEQ/L (ref 135–145)
VITAMIN D 25-HYDROXY: 40.8 NG/ML (ref 30–100)

## 2022-05-04 ENCOUNTER — OFFICE VISIT (OUTPATIENT)
Dept: NEPHROLOGY | Age: 77
End: 2022-05-04
Payer: MEDICARE

## 2022-05-04 VITALS
OXYGEN SATURATION: 98 % | HEART RATE: 61 BPM | DIASTOLIC BLOOD PRESSURE: 68 MMHG | BODY MASS INDEX: 26.45 KG/M2 | SYSTOLIC BLOOD PRESSURE: 140 MMHG | WEIGHT: 195 LBS

## 2022-05-04 DIAGNOSIS — N28.1 ACQUIRED RENAL CYST OF RIGHT KIDNEY: ICD-10-CM

## 2022-05-04 DIAGNOSIS — I10 ESSENTIAL HYPERTENSION: ICD-10-CM

## 2022-05-04 DIAGNOSIS — N18.32 STAGE 3B CHRONIC KIDNEY DISEASE (HCC): Primary | ICD-10-CM

## 2022-05-04 PROCEDURE — 99213 OFFICE O/P EST LOW 20 MIN: CPT | Performed by: INTERNAL MEDICINE

## 2022-05-04 PROCEDURE — 1036F TOBACCO NON-USER: CPT | Performed by: INTERNAL MEDICINE

## 2022-05-04 PROCEDURE — 4040F PNEUMOC VAC/ADMIN/RCVD: CPT | Performed by: INTERNAL MEDICINE

## 2022-05-04 PROCEDURE — 3017F COLORECTAL CA SCREEN DOC REV: CPT | Performed by: INTERNAL MEDICINE

## 2022-05-04 PROCEDURE — 1123F ACP DISCUSS/DSCN MKR DOCD: CPT | Performed by: INTERNAL MEDICINE

## 2022-05-04 PROCEDURE — G8417 CALC BMI ABV UP PARAM F/U: HCPCS | Performed by: INTERNAL MEDICINE

## 2022-05-04 PROCEDURE — G8427 DOCREV CUR MEDS BY ELIG CLIN: HCPCS | Performed by: INTERNAL MEDICINE

## 2022-05-04 NOTE — PROGRESS NOTES
Renal Progress Note    Assessment and Plan:      Diagnosis Orders   1. Stage 3b chronic kidney disease (HCC)  Basic Metabolic Panel    Vitamin D 25 Hydroxy    PTH, Intact   2. Essential hypertension     3. Acquired renal cyst of right kidney               PLAN:  1. Lab results are reviewed with the patient today in [facility]. 2. He understood. 3. I addressed his questions. 4. Serum creatinine stable at 1.58 mg/dL. 5. PTH is normal.  6. Vitamin D level is normal.  7. Medications reviewed. 8. No changes. 9. Return visit in 12 months. There is no problem list on file for this patient. Subjective:   Chief complaint:  Chief Complaint   Patient presents with    Chronic Kidney Disease     Stage IIIb      HPI:This is a follow up visit for Mr. Sudheer Mcclellan who is here today for return appointment. Sees him for chronic kidney disease. Was last seen about 12 months ago. Doing well since then. Serum creatinine was 1.5 mg/dL at that time. Creatinine now is 1.58 mg/dL. No difficulties with urination. Appetite is good. No chest pain or shortness of breath. No nausea or vomiting. No fever chills. No headaches. He has no new medications. ROS:  Pertinent positives stated above in HPI. All other systems were reviewed and were negative. Medications:     Current Outpatient Medications   Medication Sig Dispense Refill    simvastatin (ZOCOR) 20 MG tablet Take 20 mg by mouth nightly      olmesartan (BENICAR) 20 MG tablet Take 20 mg by mouth daily       No current facility-administered medications for this visit.        Lab Results:    CBC:   Lab Results   Component Value Date    WBC 10.2 07/28/2020    HGB 13.6 07/28/2020    HCT 40.3 (A) 07/28/2020     07/28/2020     BMP:    Lab Results   Component Value Date     04/28/2022     05/07/2021     11/05/2020    K 4.9 04/28/2022    K 4.7 05/07/2021    K 4.8 11/05/2020     04/28/2022     05/07/2021     11/05/2020 CO2 27 04/28/2022    CO2 28 05/07/2021    CO2 29 11/05/2020    BUN 27 (H) 04/28/2022    BUN 24 (H) 05/07/2021    BUN 27 (H) 11/05/2020    CREATININE 1.58 (H) 04/28/2022    CREATININE 1.5 (H) 05/07/2021    CREATININE 1.4 (H) 11/05/2020    GLUCOSE 93 04/28/2022    GLUCOSE 122 (H) 05/07/2021    GLUCOSE 100 11/05/2020      Hepatic:   Lab Results   Component Value Date    AST 20 07/28/2020    AST 19 06/18/2020    AST 24 05/14/2016    ALT 15 07/28/2020    ALT 17 06/18/2020    ALT 23 05/14/2016    BILITOT 0.5 07/28/2020    BILITOT 0.6 06/18/2020    BILITOT 0.7 05/14/2016    ALKPHOS 61 05/14/2016    ALKPHOS 62 05/16/2015    ALKPHOS 64 05/10/2014     BNP: No results found for: BNP  Lipids:   Lab Results   Component Value Date    CHOL 151 07/28/2020    HDL 35 07/28/2020     INR: No results found for: INR  URINE:   Lab Results   Component Value Date    PROTUR 7.7 11/05/2020     No results found for: NITRU, COLORU, PHUR, LABCAST, WBCUA, RBCUA, MUCUS, TRICHOMONAS, YEAST, BACTERIA, CLARITYU, SPECGRAV, LEUKOCYTESUR, UROBILINOGEN, BILIRUBINUR, BLOODU, GLUCOSEU, KETUA, AMORPHOUS   Microalbumen/Creatinine ratio:  No components found for: RUCREAT    Objective:   Vitals: BP (!) 140/68 (Site: Left Upper Arm, Position: Sitting, Cuff Size: Large Adult)   Pulse 61   Wt 195 lb (88.5 kg)   SpO2 98%   BMI 26.45 kg/m²      Constitutional:  Alert, awake, no apparent distress  Skin:normal with no rash or any significant lesions  HEENT:Pupils are reactive . Throat is clear. Oral mucosa is moist.  Neck:supple with no thyromegaly, JVD, lymphadenopathy or bruit   Cardiovascular: Regular sinus rhythm without murmur, rubs or gallops   Respiratory:  Clear to auscultation with no wheezes or rales  Abdomen: Good bowel sound, soft, non tender and no bruit  Ext: No LE edema  Musculoskeletal:Intact  Neuro:Alert, awake and oriented with no obvious focal deficit.   Speech is normal.    Electronically signed by Isaac Sapp MD on 5/4/2022 at 12:13 PM **This report has been created using voice recognition software. It maycontain minor  errors which are inherent in voice recognition technology. **

## 2022-05-14 ENCOUNTER — APPOINTMENT (OUTPATIENT)
Dept: GENERAL RADIOLOGY | Age: 77
End: 2022-05-14
Payer: MEDICARE

## 2022-05-14 ENCOUNTER — HOSPITAL ENCOUNTER (EMERGENCY)
Age: 77
Discharge: HOME | End: 2022-05-14
Attending: EMERGENCY MEDICINE
Payer: MEDICARE

## 2022-05-14 VITALS
RESPIRATION RATE: 16 BRPM | TEMPERATURE: 98.4 F | SYSTOLIC BLOOD PRESSURE: 123 MMHG | HEIGHT: 71 IN | WEIGHT: 190 LBS | HEART RATE: 62 BPM | BODY MASS INDEX: 26.6 KG/M2 | OXYGEN SATURATION: 98 % | DIASTOLIC BLOOD PRESSURE: 54 MMHG

## 2022-05-14 DIAGNOSIS — R55 NEAR SYNCOPE: Primary | ICD-10-CM

## 2022-05-14 LAB
ANION GAP SERPL CALCULATED.3IONS-SCNC: 8 MEQ/L (ref 8–16)
BUN BLDV-MCNC: 33 MG/DL (ref 7–22)
CALCIUM SERPL-MCNC: 8.6 MG/DL (ref 8.5–10.5)
CHLORIDE BLD-SCNC: 111 MEQ/L (ref 98–111)
CO2: 23 MEQ/L (ref 23–33)
CREAT SERPL-MCNC: 1.6 MG/DL (ref 0.4–1.2)
GFR SERPL CREATININE-BSD FRML MDRD: 42 ML/MIN/1.73M2
GLUCOSE BLD-MCNC: 131 MG/DL (ref 70–108)
OSMOLALITY CALCULATION: 292.2 MOSMOL/KG (ref 275–300)
POTASSIUM SERPL-SCNC: 4.5 MEQ/L (ref 3.5–5.2)
PRO-BNP: 131.3 PG/ML (ref 0–1800)
SCAN OF BLOOD SMEAR: NORMAL
SODIUM BLD-SCNC: 142 MEQ/L (ref 135–145)
TROPONIN T: < 0.01 NG/ML

## 2022-05-14 PROCEDURE — 99285 EMERGENCY DEPT VISIT HI MDM: CPT

## 2022-05-14 PROCEDURE — 83880 ASSAY OF NATRIURETIC PEPTIDE: CPT

## 2022-05-14 PROCEDURE — 36415 COLL VENOUS BLD VENIPUNCTURE: CPT

## 2022-05-14 PROCEDURE — 71045 X-RAY EXAM CHEST 1 VIEW: CPT

## 2022-05-14 PROCEDURE — 84484 ASSAY OF TROPONIN QUANT: CPT

## 2022-05-14 PROCEDURE — 80048 BASIC METABOLIC PNL TOTAL CA: CPT

## 2022-05-14 PROCEDURE — 93005 ELECTROCARDIOGRAM TRACING: CPT | Performed by: EMERGENCY MEDICINE

## 2022-05-14 PROCEDURE — 85025 COMPLETE CBC W/AUTO DIFF WBC: CPT

## 2022-05-14 ASSESSMENT — PAIN - FUNCTIONAL ASSESSMENT: PAIN_FUNCTIONAL_ASSESSMENT: NONE - DENIES PAIN

## 2022-05-14 ASSESSMENT — ENCOUNTER SYMPTOMS
ALLERGIC/IMMUNOLOGIC NEGATIVE: 1
VOMITING: 0
ABDOMINAL DISTENTION: 0
NAUSEA: 0
COUGH: 0
EYES NEGATIVE: 1
ABDOMINAL PAIN: 0
GASTROINTESTINAL NEGATIVE: 1
SHORTNESS OF BREATH: 0
RESPIRATORY NEGATIVE: 1
CHEST TIGHTNESS: 0
PHOTOPHOBIA: 0
SINUS PRESSURE: 0
CONSTIPATION: 0
DIARRHEA: 0
SINUS PAIN: 0

## 2022-05-14 NOTE — ED PROVIDER NOTES

## 2022-05-14 NOTE — ED NOTES
Assumed care at this time. Received report from [employer]. Pt resting in bed alert and denies any needs. No distress noted.  Pt stable at this time     Ghada Eckert RN  05/14/22 2460

## 2022-05-14 NOTE — ED NOTES
Patient to ED via EMS after a syncopal episode. Patient was outside trimming trees. Patient became over heated and went inside. Patient pass out while sitting. EMS reports when they showed up patient wanted to sign off. As EMS was evaluating patient became light headed and dizzy again. EMS reports low BPs and low heart rate. Patient decided to come to ER for further evaluation. Patient was given 500ml bolus while in route to ER.  On arrival VSS patient reports feeling back to normal      Mykel Houston RN  05/14/22 9769

## 2022-05-14 NOTE — ED PROVIDER NOTES
[facility] ENCOUNTER          Pt Name: Cem Stout  MRN: [id number]  [place] 1945  Date of evaluation: 5/14/2022  Treating Resident Physician: Geraldo Sears MD  Supervising Physician: Dr. Pat Salinas       Chief Complaint   Patient presents with    Loss of Consciousness     History obtained from the patient. HISTORY OF PRESENT ILLNESS    HPI  Cem Stout is a 77 y.o. male who presents to the emergency department for evaluation of tenderness, dizziness. Patient states that he was outside today trimming trees and bushes when he felt like he became overheated and started to sweat significantly accompanied with lightheadedness and dizziness. Patient states that he went into the house and his wife gave him some ice packs which additionally improved his symptoms. Patient rested his head on the table at home and the wife thought he may have passed out so called EMS to which the  reported \"you better not be calling an ambulance\". Per EMS, patient was hypotensive and bradycardic on arrival.  EMS gave patient initial 500 cc bolus of fluids which resolved the hypotension and bradycardia. Patient denies experiencing any chest pain or shortness of breath during these episodes. Patient denies any loss of consciousness. The patient has no other acute complaints at this time. REVIEW OF SYSTEMS   Review of Systems   Constitutional: Positive for diaphoresis. Negative for activity change, chills, fatigue and fever. HENT: Negative. Negative for sinus pressure and sinus pain. Eyes: Negative. Negative for photophobia and visual disturbance. Respiratory: Negative. Negative for cough, chest tightness and shortness of breath. Cardiovascular: Negative. Negative for chest pain and leg swelling. Gastrointestinal: Negative.   Negative for abdominal distention, abdominal pain, constipation, diarrhea, nausea and vomiting. Endocrine: Negative. Genitourinary: Negative. Negative for dysuria and hematuria. Musculoskeletal: Negative. Skin: Negative. Negative for rash and wound. Allergic/Immunologic: Negative. Neurological: Positive for dizziness and light-headedness. Negative for numbness and headaches. Hematological: Negative. Psychiatric/Behavioral: Negative. Negative for agitation and confusion. All other systems reviewed and are negative. PAST MEDICAL AND SURGICAL HISTORY     Past Medical History:   Diagnosis Date    BPH (benign prostatic hyperplasia)     Hyperlipidemia     Hyperlipidemia     Hypertension      Past Surgical History:   Procedure Laterality Date    BACK SURGERY Left 9/18/2020    EXCISION BCC LEFT LOWER BACK WITH FROZEN SECTION performed [facility] Finn [facility]   No current facility-administered medications for this encounter. Current Outpatient Medications:     simvastatin (ZOCOR) 20 MG tablet, Take 20 mg by mouth nightly, Disp: , Rfl:     olmesartan (BENICAR) 20 MG tablet, Take 20 mg by mouth daily, Disp: , Rfl:       SOCIAL HISTORY     Social History     Social History Narrative    Not on file     Social History     Tobacco Use    Smoking status: Never Smoker    Smokeless tobacco: Never Used   Substance Use Topics    Alcohol use: Not Currently    Drug use: Never         ALLERGIES   No Known Allergies      FAMILY HISTORY   History reviewed. No pertinent family history. PREVIOUS RECORDS   Previous records reviewed: This is this patient's [facility] to [facility] ED, no previous records available on EMR. .        PHYSICAL EXAM     ED Triage Vitals   BP Temp Temp Source Pulse Resp SpO2 Height Weight   05/14/22 1205 05/14/22 1204 05/14/22 1204 05/14/22 1204 05/14/22 1204 05/14/22 1204 05/14/22 1204 05/14/22 1204   (!) 119/57 98.4 °F (36.9 °C) Oral 64 20 99 % 5' 11\" (1.803 m) 190 lb (86.2 kg)     Initial vital signs and nursing assessment reviewed and normal. Body mass index is 26.5 kg/m². Pulsoximetry is normal per my interpretation. Additional Vital Signs:  Vitals:    05/14/22 1405   BP: (!) 123/54   Pulse: 62   Resp: 16   Temp:    SpO2: 98%       Physical Exam  Vitals and nursing note reviewed. Constitutional:       Appearance: He is normal weight. HENT:      Head: Normocephalic and atraumatic. Eyes:      Extraocular Movements: Extraocular movements intact. Pupils: Pupils are equal, round, and reactive to light. Cardiovascular:      Rate and Rhythm: Normal rate and regular rhythm. Pulses: Normal pulses. Heart sounds: Normal heart sounds. Pulmonary:      Effort: Pulmonary effort is normal.      Breath sounds: Normal breath sounds. Abdominal:      General: Bowel sounds are normal.      Palpations: Abdomen is soft. Musculoskeletal:         General: Normal range of motion. Skin:     General: Skin is warm and dry. Capillary Refill: Capillary refill takes less than 2 seconds. Neurological:      General: No focal deficit present. Mental Status: He is alert. Cranial Nerves: Cranial nerves are intact. Sensory: Sensation is intact. Motor: Motor function is intact. Coordination: Coordination is intact. Gait: Gait is intact. MEDICAL DECISION MAKING   Initial Assessment:   1. Patient is a 81-year-old male presenting the emergency department because of lightheadedness dizziness and diaphoresis. Patient reportedly out in the yard working under the hot sun with poor oral intake. Per EMS initially hypotensive and bradycardic which resolved after initial fluid bolus. Exam as above. Patient likely experienced heat exhaustion and dehydration. Other items on the differential include ACS, PE, electrolyte derangements, metabolic encephalopathy, posterior stroke.   Patient has no associated risk factors for PE at this time and symptoms have since resolved making posterior stroke unlikely. We will follow-up with work-up. Plan:    CBC, BMP, troponin, BNP, chest x-ray, EKG. ED RESULTS   Laboratory results:  Labs Reviewed   BASIC METABOLIC PANEL - Abnormal; Notable for the following components:       Result Value    Glucose 131 (*)     BUN 33 (*)     CREATININE 1.6 (*)     All other components within normal limits   CBC WITH AUTO DIFFERENTIAL - Abnormal; Notable for the following components:    WBC 19.0 (*)     RBC 3.61 (*)     Hemoglobin 12.2 (*)     Hematocrit 38.2 (*)     .8 (*)     MCH 33.8 (*)     MCHC 31.9 (*)     RDW-SD 51.8 (*)     MPV 9.0 (*)     All other components within normal limits   GLOMERULAR FILTRATION RATE, ESTIMATED - Abnormal; Notable for the following components:    Est, Glom Filt Rate 42 (*)     All other components within normal limits   BRAIN NATRIURETIC PEPTIDE   TROPONIN   ANION GAP   OSMOLALITY   SCAN OF BLOOD SMEAR       Radiologic studies results:  XR CHEST PORTABLE   Final Result   1. No acute cardiopulmonary finding. **This report has been created using voice recognition software. It may contain minor errors which are inherent in voice recognition technology. **      Final report electronically signed by Dr Marya Hernandez on 5/14/2022 12:38 PM          ED Medications administered this visit: Medications - No data to display      ED COURSE     ED Course as of 05/14/22 1551   Sat May 14, 2022   1324 CBC with Auto Differential(!):    WBC 19.0(!)   RBC 3.61(!)   Hemoglobin Quant 12.2(!)   Hematocrit 38.2(!)   .8(!)   MCH 33.8(!)   MCHC 31.9(!)   RDW-CV 13.3   RDW-SD 51.8(!)   Platelet Count 081   MPV 9.0(!) [CHANDRA]   1698 Basic Metabolic Panel(!):    Sodium 142   Potassium 4.5   Chloride 111   CO2 23   GLUCOSE, FASTING,(!)   BUN,BUNPL 33(!)   Creatinine 1.6(!)   CALCIUM, SERUM, 355119 8.6 [CHANDRA]   1340 CBC with Auto Differential(!):    WBC 19.0(!)   RBC 3.61(!)   Hemoglobin Quant 12.2(!) Hematocrit 38.2(!)   .8(!)   MCH 33.8(!)   MCHC 31.9(!)   RDW-CV 13.3   RDW-SD 51.8(!)   Platelet Count 839   MPV 9.0(!) [CHANDRA]   1340 Troponin:    Troponin T < 0.010 [CHANDRA]   1340 Brain Natriuretic Peptide:    Pro-.3 [CHANDRA]   1340 XR CHEST PORTABLE [CHANDRA]   1340 EKG Syncope [CHANDRA]   1550 Patient found to have incidental elevation white blood cell count without any obvious infection or other symptoms. Patient says he feels like he is back at his baseline. At this time I suspect that the patient's lightheaded and dizziness was secondary to heat exhaustion. Patient advised to follow-up with his primary care doctor regarding his elevated white blood cell count for repeat laboratory work which may require more extensive work-up if the white blood cell count does not normalize. Patient given appropriate return precautions. [CHANDRA]      ED Course User Index  [CHANDRA] Ronal Coleman MD       Strict return precautions and follow up instructions were discussed with the patient prior to discharge, with which the patient agrees. MEDICATION CHANGES     Discharge Medication List as of 5/14/2022  2:11 PM            FINAL DISPOSITION     Final diagnoses:   Near syncope     Condition: condition: stable  Dispo: Discharge to home      This transcription was electronically signed. Parts of this transcriptions may have been dictated by use of voice recognition software and electronically transcribed, and parts may have been transcribed with the assistance of an ED scribe. The transcription may contain errors not detected in proofreading. Please refer to my supervising physician's documentation if my documentation differs.     Electronically Signed: Tino Pierre MD, 05/14/22, 3:51 PM         Ronal Coleman MD  Resident  05/14/22 9608

## 2022-05-15 LAB
ATYPICAL LYMPHOCYTES: ABNORMAL %
BASOPHILS # BLD: 0.5 %
BASOPHILS ABSOLUTE: 0.1 THOU/MM3 (ref 0–0.1)
DIFFERENTIAL TYPE: ABNORMAL
EKG ATRIAL RATE: 65 BPM
EKG P AXIS: 60 DEGREES
EKG P-R INTERVAL: 354 MS
EKG Q-T INTERVAL: 462 MS
EKG QRS DURATION: 138 MS
EKG QTC CALCULATION (BAZETT): 480 MS
EKG R AXIS: -58 DEGREES
EKG T AXIS: 104 DEGREES
EKG VENTRICULAR RATE: 65 BPM
EOSINOPHIL # BLD: 0.6 %
EOSINOPHILS ABSOLUTE: 0.1 THOU/MM3 (ref 0–0.4)
ERYTHROCYTE [DISTWIDTH] IN BLOOD BY AUTOMATED COUNT: 13.3 % (ref 11.5–14.5)
ERYTHROCYTE [DISTWIDTH] IN BLOOD BY AUTOMATED COUNT: 51.8 FL (ref 35–45)
HCT VFR BLD CALC: 38.2 % (ref 42–52)
HEMOGLOBIN: 12.2 GM/DL (ref 14–18)
IMMATURE GRANS (ABS): 0.06 THOU/MM3 (ref 0–0.07)
IMMATURE GRANULOCYTES: 0.3 %
LYMPHOCYTES # BLD: 44.6 %
LYMPHOCYTES ABSOLUTE: 8.5 THOU/MM3 (ref 1–4.8)
MCH RBC QN AUTO: 33.8 PG (ref 26–33)
MCHC RBC AUTO-ENTMCNC: 31.9 GM/DL (ref 32.2–35.5)
MCV RBC AUTO: 105.8 FL (ref 80–94)
MONOCYTES # BLD: 8.3 %
MONOCYTES ABSOLUTE: 1.6 THOU/MM3 (ref 0.4–1.3)
NUCLEATED RED BLOOD CELLS: 0 /100 WBC
PATHOLOGIST REVIEW: ABNORMAL
PLATELET # BLD: 191 THOU/MM3 (ref 130–400)
PMV BLD AUTO: 9 FL (ref 9.4–12.4)
RBC # BLD: 3.61 MILL/MM3 (ref 4.7–6.1)
SEG NEUTROPHILS: 45.7 %
SEGMENTED NEUTROPHILS ABSOLUTE COUNT: 8.7 THOU/MM3 (ref 1.8–7.7)
SMUDGE CELLS: PRESENT
WBC # BLD: 19 THOU/MM3 (ref 4.8–10.8)

## 2022-05-15 PROCEDURE — 93010 ELECTROCARDIOGRAM REPORT: CPT | Performed by: NUCLEAR MEDICINE

## 2023-03-09 ENCOUNTER — HOSPITAL ENCOUNTER (OUTPATIENT)
Age: 78
Discharge: HOME | End: 2023-03-09
Payer: MEDICARE

## 2023-03-09 LAB
DEPRECATED RDW RBC AUTO: 53.1 FL (ref 35–45)
ERYTHROCYTE [DISTWIDTH] IN BLOOD BY AUTOMATED COUNT: 13.4 % (ref 11.5–14.5)
HCT VFR BLD AUTO: 39.6 % (ref 42–52)
HGB BLD-MCNC: 12.6 GM/DL (ref 14–18)
MCH RBC QN AUTO: 34.1 PG (ref 26–33)
MCHC RBC AUTO-ENTMCNC: 31.8 GM/DL (ref 32.2–35.5)
MCV RBC AUTO: 107 FL (ref 80–94)
PLATELET # BLD AUTO: 199 THOU/MM3 (ref 130–400)
PMV BLD AUTO: 9.2 FL (ref 9.4–12.4)
RBC # BLD AUTO: 3.7 MILL/MM3 (ref 4.7–6.1)
WBC # BLD AUTO: 24.9 THOU/MM3 (ref 4.8–10.8)

## 2023-03-09 PROCEDURE — 93005 ELECTROCARDIOGRAM TRACING: CPT | Performed by: PHYSICIAN ASSISTANT

## 2023-03-09 PROCEDURE — 93010 ELECTROCARDIOGRAM REPORT: CPT | Performed by: INTERNAL MEDICINE

## 2023-03-09 PROCEDURE — 36415 COLL VENOUS BLD VENIPUNCTURE: CPT

## 2023-03-09 PROCEDURE — 80048 BASIC METABOLIC PNL TOTAL CA: CPT

## 2023-03-09 PROCEDURE — 85027 COMPLETE CBC AUTOMATED: CPT

## 2023-03-10 LAB
ANION GAP SERPL CALC-SCNC: 7 MEQ/L (ref 8–16)
BUN SERPL-MCNC: 29 MG/DL (ref 7–22)
CALCIUM SERPL-MCNC: 9.1 MG/DL (ref 8.5–10.5)
CHLORIDE SERPL-SCNC: 109 MEQ/L (ref 98–111)
CO2 SERPL-SCNC: 28 MEQ/L (ref 23–33)
CREAT SERPL-MCNC: 1.6 MG/DL (ref 0.4–1.2)
GFR SERPL CREATININE-BSD FRML MDRD: 44 ML/MIN/1.73M2
GLUCOSE SERPL-MCNC: 109 MG/DL (ref 70–108)
POTASSIUM SERPL-SCNC: 5 MEQ/L (ref 3.5–5.2)
SODIUM SERPL-SCNC: 144 MEQ/L (ref 135–145)

## 2023-03-12 LAB
EKG ATRIAL RATE: 71 BPM
EKG P AXIS: 54 DEGREES
EKG P-R INTERVAL: 346 MS
EKG Q-T INTERVAL: 430 MS
EKG QRS DURATION: 152 MS
EKG QTC CALCULATION (BAZETT): 467 MS
EKG R AXIS: -33 DEGREES
EKG T AXIS: 98 DEGREES
EKG VENTRICULAR RATE: 71 BPM

## 2023-03-22 NOTE — PROGRESS NOTES
EKG 3/9/23 given to anesthesia for review.  Per Dr. Amber Ching EKG ok to proceed at the surgery center 3/29 without further intervention

## 2023-03-22 NOTE — PROGRESS NOTES
In preparation for their surgical procedure above patient was screened for Obstructive Sleep Apnea (SORIN) using the STOP-Bang Questionnaire by the Pre-Admission Testing department. This is a pre-surgical screening tool for patient safety and serves as a recommendation, this WILL NOT cause cancellation of surgery. STOP-Bang Questionnaire  * Do you currently see a pulmonologist?  No     If yes STOP, do not complete. Patient follows with Dr.     1.  Do you snore loudly (able to be heard in the next room)? No    2. Do you often feel tired or sleepy during the daytime? No       3. Has anyone ever told you that you stop breathing during your sleep? No    4. Do you have or are you being treated for high blood pressure? Yes      5. BMI more than 35? BMI (Calculated): 25.9        No    6. Age over 48 years? 69 y.o. Yes    7. Neck Circumference greater than 17 inches for male or 16 inches for female? Measured           (visits only)            Not Applicable    8. Gender Male? Yes      TOTAL SCORE: 3    SORIN - Low Risk : Yes to 0 - 2 questions  SORIN - Intermediate Risk : Yes to 3 - 4 questions  SORIN - High Risk : Yes to 5 - 8 questions    Adapted from:   STOP Questionnaire: A Tool to Screen Patients for Obstructive Sleep Apnea   CHIKIS HoyosC[employer]P.C., Home Meeks M.B.B.S., Elton Stanley M.D., Ephraim Tamez. Minal Stevens, Ph.D., EBENEZER Addison.B.B.S., Anthony Avendano, M.Sc., Myra Koo M.D., Santosh Lr. CRISTÓBAL Castellanos.P.C.    Anesthesiology 2008; [id number] Copyright 2008, the [address] of [employer]esthes[employer], [facility].   ----------------------------------------------------------------------------------------------------------------

## 2023-03-22 NOTE — PROGRESS NOTES
NPO after midnight  [employer] and drivers license  Wear comfortable clean clothing  Do not bring jewelry  Shower night before and morning of surgery with a liquid antibacterial soap  Bring list of medications with dosage and how often taken  Follow all instructions given by your physician   needed at discharge  Please limit to 2 visitors for surgery  You must have a responsible adult with you day of surgery and for 24 hours after surgery  Call [phone] for any questions

## 2023-03-29 ENCOUNTER — HOSPITAL ENCOUNTER (OUTPATIENT)
Age: 78
Setting detail: OUTPATIENT SURGERY
Discharge: HOME | End: 2023-03-29
Attending: SPECIALIST | Admitting: SPECIALIST
Payer: MEDICARE

## 2023-03-29 ENCOUNTER — ANESTHESIA EVENT (OUTPATIENT)
Dept: OPERATING ROOM | Age: 78
End: 2023-03-29
Payer: MEDICARE

## 2023-03-29 ENCOUNTER — ANESTHESIA (OUTPATIENT)
Dept: OPERATING ROOM | Age: 78
End: 2023-03-29
Payer: MEDICARE

## 2023-03-29 VITALS
HEART RATE: 67 BPM | OXYGEN SATURATION: 97 % | HEIGHT: 71 IN | RESPIRATION RATE: 16 BRPM | BODY MASS INDEX: 26.99 KG/M2 | TEMPERATURE: 96.7 F | SYSTOLIC BLOOD PRESSURE: 118 MMHG | WEIGHT: 192.8 LBS | DIASTOLIC BLOOD PRESSURE: 58 MMHG

## 2023-03-29 DIAGNOSIS — C44.320 SQUAMOUS CELL CARCINOMA, FACE: Primary | ICD-10-CM

## 2023-03-29 PROCEDURE — 3700000001 HC ADD 15 MINUTES (ANESTHESIA): Performed by: SPECIALIST

## 2023-03-29 PROCEDURE — 6360000002 HC RX W HCPCS

## 2023-03-29 PROCEDURE — 6360000002 HC RX W HCPCS: Performed by: SPECIALIST

## 2023-03-29 PROCEDURE — 2500000003 HC RX 250 WO HCPCS: Performed by: SPECIALIST

## 2023-03-29 PROCEDURE — 7100000010 HC PHASE II RECOVERY - FIRST 15 MIN: Performed by: SPECIALIST

## 2023-03-29 PROCEDURE — 3600000002 HC SURGERY LEVEL 2 BASE: Performed by: SPECIALIST

## 2023-03-29 PROCEDURE — 2580000003 HC RX 258

## 2023-03-29 PROCEDURE — 2500000003 HC RX 250 WO HCPCS

## 2023-03-29 PROCEDURE — 7100000011 HC PHASE II RECOVERY - ADDTL 15 MIN: Performed by: SPECIALIST

## 2023-03-29 PROCEDURE — 3700000000 HC ANESTHESIA ATTENDED CARE: Performed by: SPECIALIST

## 2023-03-29 PROCEDURE — 3600000012 HC SURGERY LEVEL 2 ADDTL 15MIN: Performed by: SPECIALIST

## 2023-03-29 PROCEDURE — 2709999900 HC NON-CHARGEABLE SUPPLY: Performed by: SPECIALIST

## 2023-03-29 RX ORDER — LIDOCAINE HYDROCHLORIDE AND EPINEPHRINE BITARTRATE 20; .01 MG/ML; MG/ML
INJECTION, SOLUTION SUBCUTANEOUS PRN
Status: DISCONTINUED | OUTPATIENT
Start: 2023-03-29 | End: 2023-03-29 | Stop reason: ALTCHOICE

## 2023-03-29 RX ORDER — FENTANYL CITRATE 50 UG/ML
INJECTION, SOLUTION INTRAMUSCULAR; INTRAVENOUS PRN
Status: DISCONTINUED | OUTPATIENT
Start: 2023-03-29 | End: 2023-03-29 | Stop reason: SDUPTHER

## 2023-03-29 RX ORDER — LIDOCAINE HYDROCHLORIDE 20 MG/ML
INJECTION, SOLUTION INFILTRATION; PERINEURAL PRN
Status: DISCONTINUED | OUTPATIENT
Start: 2023-03-29 | End: 2023-03-29 | Stop reason: SDUPTHER

## 2023-03-29 RX ORDER — PROPOFOL 10 MG/ML
INJECTION, EMULSION INTRAVENOUS PRN
Status: DISCONTINUED | OUTPATIENT
Start: 2023-03-29 | End: 2023-03-29 | Stop reason: SDUPTHER

## 2023-03-29 RX ORDER — PYRAZINAMIDE 500 MG/1
1 TABLET ORAL EVERY 4 HOURS PRN
Qty: 12 TABLET | Refills: 0 | Status: SHIPPED | OUTPATIENT
Start: 2023-03-29 | End: 2023-04-01

## 2023-03-29 RX ORDER — SODIUM CHLORIDE 9 MG/ML
INJECTION, SOLUTION INTRAVENOUS CONTINUOUS
Status: DISCONTINUED | OUTPATIENT
Start: 2023-03-29 | End: 2023-03-29 | Stop reason: HOSPADM

## 2023-03-29 RX ORDER — PROPOFOL 10 MG/ML
INJECTION, EMULSION INTRAVENOUS CONTINUOUS PRN
Status: DISCONTINUED | OUTPATIENT
Start: 2023-03-29 | End: 2023-03-29 | Stop reason: SDUPTHER

## 2023-03-29 RX ORDER — SODIUM CHLORIDE 9 MG/ML
INJECTION, SOLUTION INTRAVENOUS CONTINUOUS PRN
Status: DISCONTINUED | OUTPATIENT
Start: 2023-03-29 | End: 2023-03-29 | Stop reason: SDUPTHER

## 2023-03-29 RX ADMIN — PROPOFOL 50 MG: 10 INJECTION, EMULSION INTRAVENOUS at 09:43

## 2023-03-29 RX ADMIN — PROPOFOL 25 MG: 10 INJECTION, EMULSION INTRAVENOUS at 10:12

## 2023-03-29 RX ADMIN — PROPOFOL 25 MG: 10 INJECTION, EMULSION INTRAVENOUS at 10:19

## 2023-03-29 RX ADMIN — FENTANYL CITRATE 50 MCG: 50 INJECTION, SOLUTION INTRAMUSCULAR; INTRAVENOUS at 09:46

## 2023-03-29 RX ADMIN — Medication 2000 MG: at 09:41

## 2023-03-29 RX ADMIN — SODIUM CHLORIDE: 9 INJECTION, SOLUTION INTRAVENOUS at 09:41

## 2023-03-29 RX ADMIN — PROPOFOL 50 MG: 10 INJECTION, EMULSION INTRAVENOUS at 09:46

## 2023-03-29 RX ADMIN — FENTANYL CITRATE 50 MCG: 50 INJECTION, SOLUTION INTRAMUSCULAR; INTRAVENOUS at 10:03

## 2023-03-29 RX ADMIN — PROPOFOL 100 MCG/KG/MIN: 10 INJECTION, EMULSION INTRAVENOUS at 09:57

## 2023-03-29 RX ADMIN — LIDOCAINE HYDROCHLORIDE 40 MG: 20 INJECTION, SOLUTION INFILTRATION; PERINEURAL at 09:41

## 2023-03-29 ASSESSMENT — PAIN SCALES - GENERAL: PAINLEVEL_OUTOF10: 0

## 2023-03-29 ASSESSMENT — PAIN - FUNCTIONAL ASSESSMENT: PAIN_FUNCTIONAL_ASSESSMENT: NONE - DENIES PAIN

## 2023-03-29 NOTE — DISCHARGE INSTRUCTIONS
infection are:  Redness and pain around the area where you had surgery  Drainage of cloudy fluid from your surgical wound  Fever    Can SSIs be treated? Yes. Most surgical site infections can be treated with antibiotics. The antibiotic given to you depends on the bacteria (germs) causing the infection. Sometimes patients with SSIs also need another surgery to treat the infection. What are some of the things that hospitals are doing to prevent SSIs? To prevent SSIs, doctors, nurses, and other healthcare providers: May remove some of your hair immediately before your surgery using electric clippers if the hair is in the same area where the procedure will occur. They should not shave you with a razor. Give you antibiotics before your surgery starts. In most cases, you should get antibiotics within 60 minutes before the surgery starts and the antibiotics should be stopped within 24 hours after surgery. Clean the skin at the site of your surgery with a special soap that kills germs. Clean their hands and arms up to their elbows with an antiseptic agent just before the surgery. Wear special hair covers, masks, gowns, and gloves during surgery to  keep the surgery area clean. Clean their hands with soap and water or an alcohol-based hand rub before and after caring for each patient. If you do not see your providers clean their hands, please     ask  them to do so. What can I do to help prevent SSIs? Before your surgery:  Tell your doctor about other medical problems you may have. Health problems such as allergies, diabetes, and obesity could affect your surgery and your treatment. Quit smoking. Patients who smoke get more infections. Talk to your doctor about how you can quit before your surgery. Do not shave near where you will have surgery. Shaving with a razor can irritate your skin and make it easier to develop an infection.   At the time of your surgery:  Speak up if someone tries to

## 2023-03-29 NOTE — PROGRESS NOTES
1032 Patient arrived to phase II via chair. Spontaneous respiraitons even and unlabored. Placed on monitor--VSS. Report received from OR  1033 Assessment completed. Patient is alert and oriented x4. IV infusing at [facility]- no complications. Patient denies pain--will monitor. Surgical sites clean and dry. 1036 Snack and drink provided. Pt. Denies all other needs. Family at bedside. Call light handed to the pt.  1050 RN at bedside. Pt. And spouse state readiness to be discharged. 1055 Pt. Standing with stand by assist of RN. Pt. Denies weakness or dizziness. 1100 Discharge instructions reviewed with the pt. And spouse. All questions addressed. AVS handed to spouse. 1101 INT removed. No complications noted. 1102 Pt. Getting dressed with the assistance of his spouse. 0 Family left to get private vehicle  1114 Pt. Ambulated to private vehicle in stable condition with RN at his side.

## 2023-03-29 NOTE — OP NOTE
Operative Note    Patient name: Alexis Jacobo             Medical Record Number: [id number]    Primary Care Physician: Roxanna GO 1945    Date of Procedure: 3/29/2023    Pre-operative Diagnosis: 7cm2 defect of right lateral temple s/p MOHS for squamous cell carcinoma    Post-operative Diagnosis: Same    Procedure Performed: Repair of right lateral temple defect with an adjacent tissue transfer (47 cm2) (CPT 05479)    Surgeons/Assistants: MD Yue Dickey PA-C    Estimated Blood Loss: 5ml     Complications: none immediately appreciated    Procedure: With the patient lying in the supine position and under adequate anesthesia per the anesthesia team, the area was anesthetized with a total of 19 ml of 1% Lidocaine 1:100,000 with epinephrine solution. The area was then prepped and draped in the standard surgical fashion. There was a sizeable (7cm2) defect, which could not be closed primarily due to tension and distortion of right lateral canthal area. Therefore, a 47cm2 (8cm x 5cm + 7cm2) sum of defect/adjacent tissue transfer inferiorly based rotation flap was then designed, back cut 8cm along the right preauricular sulcus, elevated 5cm and inset with 4-0 Monocryl suture placed in interrupted buried fashion over a sheet of surgicel placed beneath the flap. The Burow's triangles were resected. Final dressing was benzoin/steristrips and bulky sterile head wrap. The patient tolerated the procedure quite well and remained hemodynamically stable throughout the procedure and was quite comfortable throughout the operative course.     Clinical staging for cancer cases:  Ct  Cn  Cm    Skylar Stephens MD  Electronically signed by me on 3/29/2023 at 10:55 AM Operative Note      Patient: Alexis Jacobo  YOB: 1945  MRN: [id number]    Date of Procedure: 3/29/2023    Pre-Op Diagnosis: SCC (squamous cell carcinoma), face [C44.320]    Post-Op Diagnosis: Same

## 2023-03-29 NOTE — ANESTHESIA POSTPROCEDURE EVALUATION
Department of Anesthesiology  Postprocedure Note    Patient: Rush Melendez  MRN: [id number]  YOB: 1945  Date of evaluation: 3/29/2023      Procedure Summary     Date: 03/29/23 [facility]    Anesthesia Start: 9758 Anesthesia Stop: 9603    Procedure: MOHS SCC RIGHT LATERAL TEMPLE (Right: Face) Diagnosis:       SCC (squamous cell carcinoma), face      (SCC (squamous cell carcinoma), face [P98.044])    Surgeons: Elizabeth Valadez MD Responsible Provider: Eliseo Jolley DO    Anesthesia Type: MAC ASA Status: 2          Anesthesia Type: MAC    Jolie Phase I:      Jolie Phase II: Jolie Score: 10      Anesthesia Post Evaluation    Comments: Frederick Conner 61  POST-ANESTHESIA NOTE       Name:  Rush Melendez                                         Age:  69 y.o. MRN:  [id number]      Last Vitals:  BP (!) 118/58   Pulse 67   Temp (!) 96.7 °F (35.9 °C) (Temporal)   Resp 16   Ht 5' 11\" (1.803 m)   Wt 192 lb 12.8 oz (87.5 kg)   SpO2 97%   BMI 26.89 kg/m²   Patient Vitals in the past 4 hrs:  03/29/23 1032, BP:(!) 118/58, Temp:(!) 96.7 °F (35.9 °C), Temp src:Temporal, Pulse:67, Resp:16, SpO2:97 %    Level of Consciousness:  Awake    Respiratory:  Stable    Oxygen Saturation:  Stable    Cardiovascular:  Stable    Hydration:  Adequate    PONV:  Stable    Post-op Pain:  Adequate analgesia    Post-op Assessment:  No apparent anesthetic complications    Additional Follow-Up / Treatment / Comment:  None    Trevor Check.  DO Tg  March 29, 2023   1:10 PM

## 2023-03-29 NOTE — PROGRESS NOTES
0401 Patient wife put call light on, states patient feels faint. Vitals assessed, BP 88/49. HR 60, oxygen 99%. Patient laid back, cold washcloth applied. Dr Tanya Calloway called, 0.9 infusing into IV.   0902 /54, HR 61, oxygen 99%. Patient states he is starting to feel better. 0910 /58, HR 59, Oxygen 97%. Patient states he feels back to normal. IV still infusing.

## 2023-03-29 NOTE — ANESTHESIA PRE PROCEDURE
Department of Anesthesiology  Preprocedure Note       Name:  Alcon Schulte   Age:  69 y.o.  :  1945                                          MRN:  [id number]         Date:  3/29/2023      Surgeon: Cristal Reaves):  J Carlos Garner MD    Procedure: Procedure(s):  MOHS SCC RIGHT LATERAL TEMPLE    Medications prior to admission:   Prior to Admission medications    Medication Sig Start Date End Date Taking? Authorizing Provider   Multiple Vitamin (MULTIVITAMIN PO) Take by mouth daily   Yes Historical Provider, MD   simvastatin (ZOCOR) 20 MG tablet Take 20 mg by mouth nightly    Historical Provider, MD   olmesartan (BENICAR) 20 MG tablet Take 20 mg by mouth daily    Historical Provider, MD       Current medications:    Current Facility-Administered Medications   Medication Dose Route Frequency Provider Last Rate Last Admin    0.9 % sodium chloride infusion   IntraVENous Continuous J Carlos Garner MD        ceFAZolin (ANCEF) 2000 mg in 0.9% sodium chloride 50 mL IVPB  2,000 mg IntraVENous Once J Carlos Garner MD           Allergies:  No Known Allergies    Problem List:  There is no problem list on file for this patient.       Past Medical History:        Diagnosis Date    BPH (benign prostatic hyperplasia)     Chronic lymphocytic leukemia (HCC)     Hyperlipidemia     Hypertension     Skin cancer        Past Surgical History:        Procedure Laterality Date    BACK SURGERY Left 2020    EXCISION BCC LEFT LOWER BACK WITH FROZEN SECTION performed by J Carlos Garner MD at [address] [place] [address]         Social History:    Social History     Tobacco Use    Smoking status: Never    Smokeless tobacco: Never   Substance Use Topics    Alcohol use: Not Currently                                Counseling given: Not Answered      Vital Signs (Current):   Vitals:    23 1157 23 0840   BP:  (!) 142/66   Pulse:  67   Resp:  16   Temp:  96.9 °F (36.1 °C)   TempSrc:  Temporal   SpO2:  98%

## 2023-03-29 NOTE — H&P
[address] [address]  History and Physical Update    Pt Name: Tulio Mercer  MRN: [id number]  YOB: 1945  Date of evaluation: 3/29/2023    I have examined the patient and reviewed the H&P/Consult and there are no changes to the patient or plans.       Corinne Nissen, MD  Electronically signed 3/29/2023 at 9:56 AM

## 2023-04-24 ENCOUNTER — HOSPITAL ENCOUNTER (OUTPATIENT)
Age: 78
Discharge: HOME | End: 2023-04-24
Payer: MEDICARE

## 2023-04-24 DIAGNOSIS — N18.32 STAGE 3B CHRONIC KIDNEY DISEASE (HCC): ICD-10-CM

## 2023-04-24 LAB
25(OH)D3 SERPL-MCNC: 35 NG/ML (ref 30–100)
ANION GAP SERPL CALC-SCNC: 11 MEQ/L (ref 8–16)
BUN SERPL-MCNC: 21 MG/DL (ref 7–22)
CALCIUM SERPL-MCNC: 9.4 MG/DL (ref 8.5–10.5)
CHLORIDE SERPL-SCNC: 107 MEQ/L (ref 98–111)
CO2 SERPL-SCNC: 25 MEQ/L (ref 23–33)
CREAT SERPL-MCNC: 1.5 MG/DL (ref 0.4–1.2)
GFR SERPL CREATININE-BSD FRML MDRD: 48 ML/MIN/1.73M2
GLUCOSE SERPL-MCNC: 100 MG/DL (ref 70–108)
POTASSIUM SERPL-SCNC: 5.4 MEQ/L (ref 3.5–5.2)
PTH-INTACT SERPL-MCNC: 45.4 PG/ML (ref 15–65)
SODIUM SERPL-SCNC: 143 MEQ/L (ref 135–145)

## 2023-04-24 PROCEDURE — 36415 COLL VENOUS BLD VENIPUNCTURE: CPT

## 2023-04-24 PROCEDURE — 82306 VITAMIN D 25 HYDROXY: CPT

## 2023-04-24 PROCEDURE — 80048 BASIC METABOLIC PNL TOTAL CA: CPT

## 2023-04-24 PROCEDURE — 83970 ASSAY OF PARATHORMONE: CPT

## 2023-04-25 ENCOUNTER — TELEPHONE (OUTPATIENT)
Dept: NEPHROLOGY | Age: 78
End: 2023-04-25

## 2023-04-25 DIAGNOSIS — E87.5 HYPERKALEMIA: Primary | ICD-10-CM

## 2023-05-01 ENCOUNTER — HOSPITAL ENCOUNTER (OUTPATIENT)
Age: 78
Discharge: HOME | End: 2023-05-01
Payer: MEDICARE

## 2023-05-01 ENCOUNTER — TELEPHONE (OUTPATIENT)
Dept: NEPHROLOGY | Age: 78
End: 2023-05-01

## 2023-05-01 DIAGNOSIS — E87.5 HYPERKALEMIA: ICD-10-CM

## 2023-05-01 LAB — POTASSIUM SERPL-SCNC: 5 MEQ/L (ref 3.5–5.2)

## 2023-05-01 PROCEDURE — 84132 ASSAY OF SERUM POTASSIUM: CPT

## 2023-05-01 PROCEDURE — 36415 COLL VENOUS BLD VENIPUNCTURE: CPT

## 2023-05-01 NOTE — TELEPHONE ENCOUNTER
----- Message from Jacqueline Meehan MD sent at 5/1/2023  4:21 PM EDT -----  Serum potassium is back to normal.

## 2023-05-03 ENCOUNTER — OFFICE VISIT (OUTPATIENT)
Dept: NEPHROLOGY | Age: 78
End: 2023-05-03
Payer: MEDICARE

## 2023-05-03 VITALS
SYSTOLIC BLOOD PRESSURE: 102 MMHG | BODY MASS INDEX: 26.32 KG/M2 | HEIGHT: 71 IN | WEIGHT: 188 LBS | OXYGEN SATURATION: 96 % | DIASTOLIC BLOOD PRESSURE: 64 MMHG | HEART RATE: 65 BPM

## 2023-05-03 DIAGNOSIS — N32.89 SPASTIC BLADDER: ICD-10-CM

## 2023-05-03 DIAGNOSIS — D63.8 ANEMIA OF CHRONIC DISEASE: ICD-10-CM

## 2023-05-03 DIAGNOSIS — N18.31 STAGE 3A CHRONIC KIDNEY DISEASE (HCC): Primary | ICD-10-CM

## 2023-05-03 DIAGNOSIS — E87.5 HYPERKALEMIA: ICD-10-CM

## 2023-05-03 DIAGNOSIS — I10 PRIMARY HYPERTENSION: ICD-10-CM

## 2023-05-03 DIAGNOSIS — C91.10 CLL (CHRONIC LYMPHOCYTIC LEUKEMIA) (HCC): ICD-10-CM

## 2023-05-03 DIAGNOSIS — E78.5 DYSLIPIDEMIA: ICD-10-CM

## 2023-05-03 PROBLEM — M19.011 PRIMARY OSTEOARTHRITIS OF RIGHT SHOULDER: Status: ACTIVE | Noted: 2023-05-03

## 2023-05-03 PROCEDURE — 99213 OFFICE O/P EST LOW 20 MIN: CPT | Performed by: INTERNAL MEDICINE

## 2023-05-03 PROCEDURE — G8417 CALC BMI ABV UP PARAM F/U: HCPCS | Performed by: INTERNAL MEDICINE

## 2023-05-03 PROCEDURE — 3074F SYST BP LT 130 MM HG: CPT | Performed by: INTERNAL MEDICINE

## 2023-05-03 PROCEDURE — G8427 DOCREV CUR MEDS BY ELIG CLIN: HCPCS | Performed by: INTERNAL MEDICINE

## 2023-05-03 PROCEDURE — 3078F DIAST BP <80 MM HG: CPT | Performed by: INTERNAL MEDICINE

## 2023-05-03 PROCEDURE — 1123F ACP DISCUSS/DSCN MKR DOCD: CPT | Performed by: INTERNAL MEDICINE

## 2023-05-03 PROCEDURE — 1036F TOBACCO NON-USER: CPT | Performed by: INTERNAL MEDICINE

## 2023-05-03 RX ORDER — M-VIT,TX,IRON,MINS/CALC/FOLIC 27MG-0.4MG
1 TABLET ORAL DAILY
COMMUNITY

## 2023-05-03 NOTE — PROGRESS NOTES
Renal Progress Note    Assessment and Plan:      Diagnosis Orders   1. Stage 3a chronic kidney disease ([facility])  Basic Metabolic Panel    Vitamin D 25 Hydroxy    PTH, Intact      2. Anemia of chronic disease        3. Primary hypertension        4. Dyslipidemia        5. Hyperkalemia  Potassium      6. CLL (chronic lymphocytic leukemia) ([facility])      Dr. Concepcion Gallardo dx 6/2022      7. Spastic bladder        8. CLL (chronic lymphocytic leukemia) ([facility] [address]                  PLAN:  Lab results reviewed with the [facility] in [facility]. He understood. He had no questions. Serum creatinine is mostly stable at 1.5 mg/dL. Potassium level is back to normal.  PTH is normal at 45.4. Vitamin D level is good at 35. Medications reviewed  Will tolterodine 5 mg a day for spastic bladder. Potential side effects discussed with the patient and spouse  Return visit in 12 months with labs  We will however check potassium level in about 3 months or so          Patient Active Problem List   Diagnosis    Primary osteoarthritis of right shoulder           Subjective:   Chief complaint:  Chief Complaint   Patient presents with    Chronic Kidney Disease     iiib      HPI:This is a follow up visit for Mr. Fabby Marsh who is here today for return appointment. I see him for chronic kidney disease. He was last seen about 12 months ago. Doing well with no complaint. However, his potassium was high in the recent lab. It is back to normal after treatment. No chest pain. No shortness of breath. He has good appetite. Denies difficulties with urination. ROS:  Pertinent positives stated above in HPI. All other systems were reviewed and were negative.   Medications:     Current Outpatient Medications   Medication Sig Dispense Refill    Multiple Vitamins-Minerals (THERAPEUTIC MULTIVITAMIN-MINERALS) tablet Take 1 tablet by mouth daily      simvastatin (ZOCOR) 20 MG tablet Take 1 tablet by mouth nightly      olmesartan (BENICAR) 20 MG tablet Take 1 tablet by

## 2024-01-02 ENCOUNTER — HOSPITAL ENCOUNTER (OUTPATIENT)
Age: 79
Discharge: HOME | End: 2024-01-02
Payer: MEDICARE

## 2024-01-02 DIAGNOSIS — M19.011 PRIMARY OSTEOARTHRITIS OF RIGHT SHOULDER: ICD-10-CM

## 2024-01-02 DIAGNOSIS — N18.31 STAGE 3A CHRONIC KIDNEY DISEASE (HCC): ICD-10-CM

## 2024-01-02 DIAGNOSIS — D63.8 ANEMIA OF CHRONIC DISEASE: ICD-10-CM

## 2024-01-02 DIAGNOSIS — E78.5 DYSLIPIDEMIA: ICD-10-CM

## 2024-01-02 DIAGNOSIS — N18.31 STAGE 3A CHRONIC KIDNEY DISEASE (HCC): Primary | ICD-10-CM

## 2024-01-02 LAB — 25(OH)D3 SERPL-MCNC: 36 NG/ML (ref 30–100)

## 2024-01-02 PROCEDURE — 83970 ASSAY OF PARATHORMONE: CPT

## 2024-01-02 PROCEDURE — 36415 COLL VENOUS BLD VENIPUNCTURE: CPT

## 2024-01-02 PROCEDURE — 82306 VITAMIN D 25 HYDROXY: CPT

## 2024-01-03 ENCOUNTER — OFFICE VISIT (OUTPATIENT)
Dept: NEPHROLOGY | Age: 79
End: 2024-01-03
Payer: MEDICARE

## 2024-01-03 VITALS
OXYGEN SATURATION: 98 % | SYSTOLIC BLOOD PRESSURE: 128 MMHG | HEIGHT: 71 IN | HEART RATE: 63 BPM | BODY MASS INDEX: 26.32 KG/M2 | DIASTOLIC BLOOD PRESSURE: 88 MMHG | WEIGHT: 188 LBS

## 2024-01-03 DIAGNOSIS — E78.5 DYSLIPIDEMIA: ICD-10-CM

## 2024-01-03 DIAGNOSIS — N18.32 STAGE 3B CHRONIC KIDNEY DISEASE (HCC): Primary | ICD-10-CM

## 2024-01-03 DIAGNOSIS — I10 ESSENTIAL HYPERTENSION: ICD-10-CM

## 2024-01-03 LAB — PTH-INTACT SERPL-MCNC: 46.2 PG/ML (ref 15–65)

## 2024-01-03 PROCEDURE — 3079F DIAST BP 80-89 MM HG: CPT | Performed by: INTERNAL MEDICINE

## 2024-01-03 PROCEDURE — G8417 CALC BMI ABV UP PARAM F/U: HCPCS | Performed by: INTERNAL MEDICINE

## 2024-01-03 PROCEDURE — 99214 OFFICE O/P EST MOD 30 MIN: CPT | Performed by: INTERNAL MEDICINE

## 2024-01-03 PROCEDURE — 1123F ACP DISCUSS/DSCN MKR DOCD: CPT | Performed by: INTERNAL MEDICINE

## 2024-01-03 PROCEDURE — G8484 FLU IMMUNIZE NO ADMIN: HCPCS | Performed by: INTERNAL MEDICINE

## 2024-01-03 PROCEDURE — G8427 DOCREV CUR MEDS BY ELIG CLIN: HCPCS | Performed by: INTERNAL MEDICINE

## 2024-01-03 PROCEDURE — 1036F TOBACCO NON-USER: CPT | Performed by: INTERNAL MEDICINE

## 2024-01-03 PROCEDURE — 3074F SYST BP LT 130 MM HG: CPT | Performed by: INTERNAL MEDICINE

## 2024-01-03 NOTE — PROGRESS NOTES
Dr. Payne mentioned that the Benicar is hard on the kidneys and blood pressure was running lower but we reduced the dose to half a tab. Patient brought bp readings since the change.  
JVD, lymphadenopathy or bruit **  Cardiovascular: Regular sinus rhythm without murmur, rubs or gallops   Respiratory:  Clear to auscultation with no wheezes or rales  Abdomen: Good bowel sound, soft, non tender and no bruit  Ext: No LE edema  Musculoskeletal:Intact  Neuro:Alert, awake and oriented with no obvious focal deficit.  Speech is normal.**    Electronically signed by Ronak Ramsay MD on 1/3/2024 at 11:14 AM   **This report has been created using voice recognition software. It maycontain minor  errors which are inherent in voice recognition technology.**

## 2024-03-26 ENCOUNTER — HOSPITAL ENCOUNTER (OUTPATIENT)
Age: 79
Discharge: HOME | End: 2024-03-26
Payer: MEDICARE

## 2024-03-26 DIAGNOSIS — N18.32 STAGE 3B CHRONIC KIDNEY DISEASE (HCC): ICD-10-CM

## 2024-03-26 LAB
25(OH)D3 SERPL-MCNC: 36 NG/ML (ref 30–100)
ANION GAP SERPL CALC-SCNC: 8 MEQ/L (ref 8–16)
BUN SERPL-MCNC: 30 MG/DL (ref 7–22)
CALCIUM SERPL-MCNC: 9.1 MG/DL (ref 8.5–10.5)
CHLORIDE SERPL-SCNC: 103 MEQ/L (ref 98–111)
CO2 SERPL-SCNC: 28 MEQ/L (ref 23–33)
CREAT SERPL-MCNC: 1.6 MG/DL (ref 0.4–1.2)
CREAT UR-MCNC: 105.8 MG/DL
GFR SERPL CREATININE-BSD FRML MDRD: 44 ML/MIN/1.73M2
GLUCOSE SERPL-MCNC: 94 MG/DL (ref 70–108)
POTASSIUM SERPL-SCNC: 5.1 MEQ/L (ref 3.5–5.2)
PROT UR-MCNC: 14.3 MG/DL
PROT/CREAT 24H UR: 0.14 MG/G{CREAT}
SODIUM SERPL-SCNC: 139 MEQ/L (ref 135–145)

## 2024-03-26 PROCEDURE — 80048 BASIC METABOLIC PNL TOTAL CA: CPT

## 2024-03-26 PROCEDURE — 36415 COLL VENOUS BLD VENIPUNCTURE: CPT

## 2024-03-26 PROCEDURE — 84156 ASSAY OF PROTEIN URINE: CPT

## 2024-03-26 PROCEDURE — 82570 ASSAY OF URINE CREATININE: CPT

## 2024-03-26 PROCEDURE — 82306 VITAMIN D 25 HYDROXY: CPT

## 2024-03-26 PROCEDURE — 83970 ASSAY OF PARATHORMONE: CPT

## 2024-03-27 LAB — PTH-INTACT SERPL-MCNC: 38.7 PG/ML (ref 15–65)

## 2024-04-03 ENCOUNTER — OFFICE VISIT (OUTPATIENT)
Dept: NEPHROLOGY | Age: 79
End: 2024-04-03
Payer: MEDICARE

## 2024-04-03 VITALS
SYSTOLIC BLOOD PRESSURE: 130 MMHG | WEIGHT: 191 LBS | OXYGEN SATURATION: 97 % | HEART RATE: 67 BPM | HEIGHT: 71 IN | DIASTOLIC BLOOD PRESSURE: 88 MMHG | BODY MASS INDEX: 26.74 KG/M2

## 2024-04-03 DIAGNOSIS — E78.5 DYSLIPIDEMIA: ICD-10-CM

## 2024-04-03 DIAGNOSIS — I10 PRIMARY HYPERTENSION: ICD-10-CM

## 2024-04-03 DIAGNOSIS — N18.32 STAGE 3B CHRONIC KIDNEY DISEASE (HCC): Primary | ICD-10-CM

## 2024-04-03 PROCEDURE — 3074F SYST BP LT 130 MM HG: CPT | Performed by: INTERNAL MEDICINE

## 2024-04-03 PROCEDURE — 1036F TOBACCO NON-USER: CPT | Performed by: INTERNAL MEDICINE

## 2024-04-03 PROCEDURE — 3078F DIAST BP <80 MM HG: CPT | Performed by: INTERNAL MEDICINE

## 2024-04-03 PROCEDURE — G8417 CALC BMI ABV UP PARAM F/U: HCPCS | Performed by: INTERNAL MEDICINE

## 2024-04-03 PROCEDURE — 99213 OFFICE O/P EST LOW 20 MIN: CPT | Performed by: INTERNAL MEDICINE

## 2024-04-03 PROCEDURE — G8427 DOCREV CUR MEDS BY ELIG CLIN: HCPCS | Performed by: INTERNAL MEDICINE

## 2024-04-03 PROCEDURE — 1123F ACP DISCUSS/DSCN MKR DOCD: CPT | Performed by: INTERNAL MEDICINE

## 2024-04-03 RX ORDER — OLMESARTAN MEDOXOMIL 5 MG/1
10 TABLET ORAL DAILY
COMMUNITY
Start: 2024-01-09

## 2024-04-03 NOTE — PROGRESS NOTES
Renal Progress Note    Assessment and Plan:      Diagnosis Orders   1. Stage 3b chronic kidney disease (HCC)        2. Primary hypertension        3. Dyslipidemia                  PLAN:  Discussed my thoughts with the patient and spouse.  They understood.  Lab result reviewed with them.  Serum creatinine is improved to 1.6 mg/dL from 1.9 mg/dL.  PTH is 38.7  Vitamin D level is 36  Urine protein creatinine ratio is 0.14 g  Medications reviewed  No changes  Wit blood pressure at home  Return visit in 6 months not 3 months with labs          Patient Active Problem List   Diagnosis    Primary osteoarthritis of right shoulder           Subjective:   Chief complaint:  Chief Complaint   Patient presents with    Follow-up     Ckd iiib      HPI:This is a follow up visit for Mr. Bhupendra Bender who is here today for return appointment.  I see him for chronic kidney disease.  He was last seen about 3 months ago.  He has no complaint.  At that time serum creatinine had increased to 1.9 mg/dL from a baseline of 1.5 and 1.6 mg/dL respectively.  As a result he was asked to come back in 3 months instead of 6 months.Currently he has no complaint.  Appetite is good.  No chest pain.  No shortness of breath.  Urinates well.  olmesartan had been decreased from 10 mg twice a day to 10 mg a day.      ROS:  Pertinent positives stated above in HPI. All other systems were reviewed and were negative.  Medications:     Current Outpatient Medications   Medication Sig Dispense Refill    olmesartan (BENICAR) 5 MG tablet Take 2 tablets by mouth daily      Multiple Vitamins-Minerals (THERAPEUTIC MULTIVITAMIN-MINERALS) tablet Take 1 tablet by mouth daily      simvastatin (ZOCOR) 20 MG tablet Take 1 tablet by mouth nightly       No current facility-administered medications for this visit.       Lab Results:    CBC:   Lab Results   Component Value Date    WBC 32.1 12/13/2023    HGB 12.7 (A) 12/13/2023    HCT 39.9 (A) 12/13/2023    .0 (H)

## 2024-09-27 ENCOUNTER — HOSPITAL ENCOUNTER (OUTPATIENT)
Age: 79
Discharge: HOME | End: 2024-09-27
Payer: MEDICARE

## 2024-09-27 DIAGNOSIS — N18.32 STAGE 3B CHRONIC KIDNEY DISEASE (HCC): ICD-10-CM

## 2024-09-27 LAB
ANION GAP SERPL CALC-SCNC: 11 MEQ/L (ref 8–16)
BUN SERPL-MCNC: 37 MG/DL (ref 7–22)
CALCIUM SERPL-MCNC: 9 MG/DL (ref 8.5–10.5)
CHLORIDE SERPL-SCNC: 108 MEQ/L (ref 98–111)
CO2 SERPL-SCNC: 22 MEQ/L (ref 23–33)
CREAT SERPL-MCNC: 1.8 MG/DL (ref 0.4–1.2)
GFR SERPL CREATININE-BSD FRML MDRD: 38 ML/MIN/1.73M2
GLUCOSE SERPL-MCNC: 95 MG/DL (ref 70–108)
POTASSIUM SERPL-SCNC: 5.8 MEQ/L (ref 3.5–5.2)
SODIUM SERPL-SCNC: 141 MEQ/L (ref 135–145)

## 2024-09-27 PROCEDURE — 80048 BASIC METABOLIC PNL TOTAL CA: CPT

## 2024-09-27 PROCEDURE — 36415 COLL VENOUS BLD VENIPUNCTURE: CPT

## 2024-09-30 ENCOUNTER — TELEPHONE (OUTPATIENT)
Dept: NEPHROLOGY | Age: 79
End: 2024-09-30

## 2024-09-30 DIAGNOSIS — N18.32 STAGE 3B CHRONIC KIDNEY DISEASE (HCC): Primary | ICD-10-CM

## 2024-09-30 DIAGNOSIS — E87.5 HYPERKALEMIA: ICD-10-CM

## 2024-09-30 NOTE — TELEPHONE ENCOUNTER
----- Message from Dr. Ronak Ramsay MD sent at 9/30/2024  7:23 AM EDT -----  Potassium level is high.  Low potassium diet  Sodium zirconium cyclosilicate 10 g a day for 3 days  Repeat potassium in about 4 to 5 days

## 2024-09-30 NOTE — TELEPHONE ENCOUNTER
Spoke with No (wife) She says they have been eating foods higher in potassium from their garden. She will make sure they stick to a low potassium diet. She would like the sodium zirconium cyclosilcate to go to [facility] pharmacy. Bhupendra will repeat the potassium lab in 4-5 days at [facility].   [facility] does not have it in stock. Adalid doesn't use a different pharmacy since [facility] closed. I will have samples, lab order and low potassium diet ready for them to  today. She says they will be in by 1 pm

## 2024-10-02 ENCOUNTER — OFFICE VISIT (OUTPATIENT)
Dept: NEPHROLOGY | Age: 79
End: 2024-10-02
Payer: MEDICARE

## 2024-10-02 VITALS
BODY MASS INDEX: 26.46 KG/M2 | DIASTOLIC BLOOD PRESSURE: 69 MMHG | HEIGHT: 71 IN | OXYGEN SATURATION: 95 % | WEIGHT: 189 LBS | HEART RATE: 62 BPM | SYSTOLIC BLOOD PRESSURE: 115 MMHG

## 2024-10-02 DIAGNOSIS — E87.20 METABOLIC ACIDOSIS: ICD-10-CM

## 2024-10-02 DIAGNOSIS — I10 PRIMARY HYPERTENSION: ICD-10-CM

## 2024-10-02 DIAGNOSIS — N18.32 STAGE 3B CHRONIC KIDNEY DISEASE (HCC): Primary | ICD-10-CM

## 2024-10-02 DIAGNOSIS — Z90.5 SOLITARY KIDNEY, ACQUIRED: ICD-10-CM

## 2024-10-02 DIAGNOSIS — E87.5 HYPERKALEMIA: ICD-10-CM

## 2024-10-02 PROCEDURE — G8417 CALC BMI ABV UP PARAM F/U: HCPCS | Performed by: INTERNAL MEDICINE

## 2024-10-02 PROCEDURE — 1036F TOBACCO NON-USER: CPT | Performed by: INTERNAL MEDICINE

## 2024-10-02 PROCEDURE — G8484 FLU IMMUNIZE NO ADMIN: HCPCS | Performed by: INTERNAL MEDICINE

## 2024-10-02 PROCEDURE — 3078F DIAST BP <80 MM HG: CPT | Performed by: INTERNAL MEDICINE

## 2024-10-02 PROCEDURE — 99214 OFFICE O/P EST MOD 30 MIN: CPT | Performed by: INTERNAL MEDICINE

## 2024-10-02 PROCEDURE — 3074F SYST BP LT 130 MM HG: CPT | Performed by: INTERNAL MEDICINE

## 2024-10-02 PROCEDURE — G8427 DOCREV CUR MEDS BY ELIG CLIN: HCPCS | Performed by: INTERNAL MEDICINE

## 2024-10-02 PROCEDURE — 1123F ACP DISCUSS/DSCN MKR DOCD: CPT | Performed by: INTERNAL MEDICINE

## 2024-10-02 NOTE — PROGRESS NOTES
Renal Progress Note    Assessment and Plan:      Diagnosis Orders   1. Stage 3b chronic kidney disease (HCC)        2. Hyperkalemia        3. Primary hypertension        4. Metabolic acidosis        5. Solitary kidney, acquired                  PLAN:  Chronic kidney disease stable with a serum creatinine of 1.8 mg/dL.  It was 1.6 mg/dL in March 2024 but  1.9 mg/day in December 2023.  Serum potassium is high at 5.8 mEq/l  He admits to high potassium rich food consumption.  Low potassium diet information provided to him   Sodium zirconium cyclosilicate 10 g a day for 3 days.  Serum potassium level will be repeated on day 4   Metabolic acidosis is mild and will be observed for now.  Medications reviewed  No changes  Return within 6 months with labs            Patient Active Problem List   Diagnosis    Primary osteoarthritis of right shoulder           Subjective:   Chief complaint:  Chief Complaint   Patient presents with    Follow-up     FU 6 MONTHS for CKD 3b      HPI:This is a follow up visit for Mr Bhupendra Bender here  today for return appointment.  I see him for chronic kidney disease.  He was last seen April 2024. Doing well since then with no complaint.  He has good appetite.  No recent hospitalizations.  No chest pain.  No shortness of breath.  He denies difficulties with urination.    ROS:  Pertinent positives stated above in HPI. All other systems were reviewed and were negative.  Medications:     Current Outpatient Medications   Medication Sig Dispense Refill    olmesartan (BENICAR) 5 MG tablet Take 2 tablets by mouth daily      Multiple Vitamins-Minerals (THERAPEUTIC MULTIVITAMIN-MINERALS) tablet Take 1 tablet by mouth daily      simvastatin (ZOCOR) 20 MG tablet Take 1 tablet by mouth nightly       No current facility-administered medications for this visit.       Lab Results:    CBC:   Lab Results   Component Value Date    WBC 32.1 12/13/2023    HGB 12.7 (A) 12/13/2023    HCT 39.9 (A) 12/13/2023    [facility]

## 2024-10-03 ENCOUNTER — HOSPITAL ENCOUNTER (OUTPATIENT)
Age: 79
Discharge: HOME | End: 2024-10-03
Payer: MEDICARE

## 2024-10-03 DIAGNOSIS — E87.5 HYPERKALEMIA: ICD-10-CM

## 2024-10-03 DIAGNOSIS — N18.32 STAGE 3B CHRONIC KIDNEY DISEASE (HCC): ICD-10-CM

## 2024-10-03 LAB — POTASSIUM SERPL-SCNC: 5 MEQ/L (ref 3.5–5.2)

## 2024-10-03 PROCEDURE — 36415 COLL VENOUS BLD VENIPUNCTURE: CPT

## 2024-10-03 PROCEDURE — 84132 ASSAY OF SERUM POTASSIUM: CPT

## 2024-10-04 ENCOUNTER — TELEPHONE (OUTPATIENT)
Dept: NEPHROLOGY | Age: 79
End: 2024-10-04

## 2024-10-04 NOTE — TELEPHONE ENCOUNTER
----- Message from Dr. Ronak Ramsay MD sent at 10/4/2024  9:01 AM EDT -----  Serum potassium is back to normal.

## 2025-04-01 LAB
ANION GAP SERPL CALCULATED.3IONS-SCNC: 7 MMOL/L (ref 4–12)
BUN BLDV-MCNC: 29 MG/DL (ref 7–20)
CALCIUM SERPL-MCNC: 8.3 MG/DL (ref 8.8–10.5)
CHLORIDE BLD-SCNC: 106 MEQ/L (ref 101–111)
CO2: 26 MEQ/L (ref 21–32)
CREAT SERPL-MCNC: 1.72 MG/DL (ref 0.6–1.3)
CREATININE CLEARANCE: 39
GLUCOSE: 91 MG/DL (ref 70–110)
POTASSIUM SERPL-SCNC: 4.6 MEQ/L (ref 3.6–5)
SODIUM BLD-SCNC: 139 MEQ/L (ref 135–145)
VITAMIN D 25-HYDROXY: 37.3 NG/ML (ref 30–100)

## 2025-04-09 ENCOUNTER — OFFICE VISIT (OUTPATIENT)
Dept: NEPHROLOGY | Age: 80
End: 2025-04-09
Payer: MEDICARE

## 2025-04-09 ENCOUNTER — TELEPHONE (OUTPATIENT)
Dept: NEPHROLOGY | Age: 80
End: 2025-04-09

## 2025-04-09 VITALS
HEIGHT: 71 IN | OXYGEN SATURATION: 96 % | WEIGHT: 186.2 LBS | HEART RATE: 74 BPM | DIASTOLIC BLOOD PRESSURE: 58 MMHG | SYSTOLIC BLOOD PRESSURE: 112 MMHG | BODY MASS INDEX: 26.07 KG/M2

## 2025-04-09 DIAGNOSIS — D63.8 ANEMIA OF CHRONIC DISEASE: ICD-10-CM

## 2025-04-09 DIAGNOSIS — N18.32 STAGE 3B CHRONIC KIDNEY DISEASE (HCC): Primary | ICD-10-CM

## 2025-04-09 DIAGNOSIS — Q60.0 SOLITARY KIDNEY, CONGENITAL: ICD-10-CM

## 2025-04-09 DIAGNOSIS — E87.20 METABOLIC ACIDOSIS: ICD-10-CM

## 2025-04-09 DIAGNOSIS — E83.51 HYPOCALCEMIA: ICD-10-CM

## 2025-04-09 PROCEDURE — G8427 DOCREV CUR MEDS BY ELIG CLIN: HCPCS | Performed by: INTERNAL MEDICINE

## 2025-04-09 PROCEDURE — 1036F TOBACCO NON-USER: CPT | Performed by: INTERNAL MEDICINE

## 2025-04-09 PROCEDURE — 1159F MED LIST DOCD IN RCRD: CPT | Performed by: INTERNAL MEDICINE

## 2025-04-09 PROCEDURE — 99213 OFFICE O/P EST LOW 20 MIN: CPT | Performed by: INTERNAL MEDICINE

## 2025-04-09 PROCEDURE — G8417 CALC BMI ABV UP PARAM F/U: HCPCS | Performed by: INTERNAL MEDICINE

## 2025-04-09 PROCEDURE — 1123F ACP DISCUSS/DSCN MKR DOCD: CPT | Performed by: INTERNAL MEDICINE

## 2025-04-09 NOTE — PROGRESS NOTES
Renal Progress Note    Assessment and Plan:      Diagnosis Orders   1. Stage 3b chronic kidney disease (HCC)  Basic Metabolic Panel    Vitamin D 25 Hydroxy      2. Hypocalcemia        3. Anemia of chronic disease        4. Metabolic acidosis        5. Solitary kidney, congenital                  PLAN:  Serum creatinine is very slightly improved to 1.72 mg/dL from 1.8 mg/dl in March 2025 and 1.82 g/L in September 2024 respectively.  Hypocalcemia is mild and likely will correct if corrected for low serum albumin level.  Hemoglobin level is stable at 11.7 g  Metabolic acidosis is resolved  Solitary kidney is congenital though it was recorded as acquired in the past.  It is actually congenital.  Medications reviewed  Suggested discontinuation of olmesartan as his blood pressure is on the low end of normal for his age and also runs low at home according to them with systolic around 88 mmHg sometimes.  This will be at the discretion of the primary care provider however.  Continue to avoid nonsteroidal anti-inflammatory medications.  Return visit in 6 months with labs          Patient Active Problem List   Diagnosis    Primary osteoarthritis of right shoulder           Subjective:   Chief complaint:No chief complaint on file.     HPI:This is a follow up visit for Mr. Bhupendra Bender here today for return appointment.  I see him for chronic kidney disease.  He also has solitary right kidney.  He was last seen October 2024.  Serum creatinine value was 1.8 mg/dL.  Today it is 1.72 mg/dL.  It was however 1.88 mg/dL in March 2025.  He has ranged between 1.40 mg/dL up to 1.9 mg/dL since 2020..  Blood pressure does run low at times at home according to them.  Otherwise denies chest pain or shortness of breath.  No difficulties with urination.  Appetite remains good.  No problem with ambulation.    ROS:  Pertinent positives stated above in HPI. All other systems were reviewed and were negative.  Medications:     Current Outpatient

## 2025-04-09 NOTE — PROGRESS NOTES
Patient states that when he is outside working in the yard/ at home he feels very fatigued and he checks his BP and it is normally running low. Wife says it has been as low as 88/52, P-84  They brought the home BP monitor today. I checked it manually and with their machine and readings were very close.

## 2025-04-09 NOTE — TELEPHONE ENCOUNTER
Carol, patients wife that you saw in clinic today would like you to give her a call. [phone]    Sorry I have no details.

## 2025-07-29 ENCOUNTER — HOSPITAL ENCOUNTER (OUTPATIENT)
Age: 80
Discharge: HOME | End: 2025-07-29
Payer: MEDICARE

## 2025-07-29 LAB
ANION GAP SERPL CALC-SCNC: 8 MEQ/L (ref 8–16)
BUN SERPL-MCNC: 31 MG/DL (ref 8–23)
CALCIUM SERPL-MCNC: 9.2 MG/DL (ref 8.8–10.2)
CHLORIDE SERPL-SCNC: 109 MEQ/L (ref 98–111)
CO2 SERPL-SCNC: 26 MEQ/L (ref 22–29)
CREAT SERPL-MCNC: 1.8 MG/DL (ref 0.7–1.2)
DEPRECATED RDW RBC AUTO: 52.8 FL (ref 35–45)
EKG ATRIAL RATE: 70 BPM
EKG P AXIS: 55 DEGREES
EKG P-R INTERVAL: 336 MS
EKG Q-T INTERVAL: 444 MS
EKG QRS DURATION: 156 MS
EKG QTC CALCULATION (BAZETT): 479 MS
EKG R AXIS: -28 DEGREES
EKG T AXIS: 91 DEGREES
EKG VENTRICULAR RATE: 70 BPM
ERYTHROCYTE [DISTWIDTH] IN BLOOD BY AUTOMATED COUNT: 13.2 % (ref 11.5–14.5)
GFR SERPL CREATININE-BSD FRML MDRD: 38 ML/MIN/1.73M2
GLUCOSE SERPL-MCNC: 100 MG/DL (ref 74–109)
HCT VFR BLD AUTO: 36.2 % (ref 42–52)
HGB BLD-MCNC: 11.6 GM/DL (ref 14–18)
MCH RBC QN AUTO: 34.5 PG (ref 26–33)
MCHC RBC AUTO-ENTMCNC: 32 GM/DL (ref 32.2–35.5)
MCV RBC AUTO: 107.7 FL (ref 80–94)
PATHOLOGIST REVIEW: ABNORMAL
PLATELET # BLD AUTO: 178 THOU/MM3 (ref 130–400)
PMV BLD AUTO: 9.5 FL (ref 9.4–12.4)
POTASSIUM SERPL-SCNC: 5.1 MEQ/L (ref 3.5–5.2)
RBC # BLD AUTO: 3.36 MILL/MM3 (ref 4.7–6.1)
SCAN OF BLOOD SMEAR: NORMAL
SODIUM SERPL-SCNC: 143 MEQ/L (ref 135–145)
WBC # BLD AUTO: 46.1 THOU/MM3 (ref 4.8–10.8)

## 2025-07-29 PROCEDURE — 80048 BASIC METABOLIC PNL TOTAL CA: CPT

## 2025-07-29 PROCEDURE — 93010 ELECTROCARDIOGRAM REPORT: CPT | Performed by: INTERNAL MEDICINE

## 2025-07-29 PROCEDURE — 85027 COMPLETE CBC AUTOMATED: CPT

## 2025-07-29 PROCEDURE — 36415 COLL VENOUS BLD VENIPUNCTURE: CPT

## 2025-07-29 PROCEDURE — 93005 ELECTROCARDIOGRAM TRACING: CPT | Performed by: SPECIALIST

## 2025-08-13 ENCOUNTER — HOSPITAL ENCOUNTER (OUTPATIENT)
Age: 80
Setting detail: OUTPATIENT SURGERY
Discharge: HOME | End: 2025-08-13
Attending: SPECIALIST | Admitting: SPECIALIST
Payer: MEDICARE

## 2025-08-13 ENCOUNTER — ANESTHESIA EVENT (OUTPATIENT)
Dept: OPERATING ROOM | Age: 80
End: 2025-08-13
Payer: MEDICARE

## 2025-08-13 ENCOUNTER — ANESTHESIA (OUTPATIENT)
Dept: OPERATING ROOM | Age: 80
End: 2025-08-13
Payer: MEDICARE

## 2025-08-13 VITALS
BODY MASS INDEX: 25.81 KG/M2 | OXYGEN SATURATION: 95 % | HEIGHT: 71 IN | TEMPERATURE: 96.8 F | WEIGHT: 184.4 LBS | SYSTOLIC BLOOD PRESSURE: 137 MMHG | RESPIRATION RATE: 16 BRPM | HEART RATE: 56 BPM | DIASTOLIC BLOOD PRESSURE: 63 MMHG

## 2025-08-13 PROCEDURE — 6360000002 HC RX W HCPCS

## 2025-08-13 PROCEDURE — 3600000002 HC SURGERY LEVEL 2 BASE: Performed by: SPECIALIST

## 2025-08-13 PROCEDURE — 3600000012 HC SURGERY LEVEL 2 ADDTL 15MIN: Performed by: SPECIALIST

## 2025-08-13 PROCEDURE — 7100000010 HC PHASE II RECOVERY - FIRST 15 MIN: Performed by: SPECIALIST

## 2025-08-13 PROCEDURE — 3700000000 HC ANESTHESIA ATTENDED CARE: Performed by: SPECIALIST

## 2025-08-13 PROCEDURE — 2580000003 HC RX 258

## 2025-08-13 PROCEDURE — 3700000001 HC ADD 15 MINUTES (ANESTHESIA): Performed by: SPECIALIST

## 2025-08-13 PROCEDURE — 6360000002 HC RX W HCPCS: Performed by: SPECIALIST

## 2025-08-13 PROCEDURE — 2500000003 HC RX 250 WO HCPCS: Performed by: SPECIALIST

## 2025-08-13 PROCEDURE — 7100000011 HC PHASE II RECOVERY - ADDTL 15 MIN: Performed by: SPECIALIST

## 2025-08-13 PROCEDURE — 2709999900 HC NON-CHARGEABLE SUPPLY: Performed by: SPECIALIST

## 2025-08-13 RX ORDER — SODIUM CHLORIDE 9 MG/ML
INJECTION, SOLUTION INTRAVENOUS
Status: DISCONTINUED | OUTPATIENT
Start: 2025-08-13 | End: 2025-08-13 | Stop reason: SDUPTHER

## 2025-08-13 RX ORDER — LIDOCAINE HYDROCHLORIDE 20 MG/ML
INJECTION, SOLUTION EPIDURAL; INFILTRATION; INTRACAUDAL; PERINEURAL
Status: DISCONTINUED | OUTPATIENT
Start: 2025-08-13 | End: 2025-08-13 | Stop reason: SDUPTHER

## 2025-08-13 RX ORDER — FENTANYL CITRATE 50 UG/ML
INJECTION, SOLUTION INTRAMUSCULAR; INTRAVENOUS
Status: DISCONTINUED | OUTPATIENT
Start: 2025-08-13 | End: 2025-08-13 | Stop reason: SDUPTHER

## 2025-08-13 RX ORDER — LIDOCAINE HYDROCHLORIDE AND EPINEPHRINE 10; 10 MG/ML; UG/ML
INJECTION, SOLUTION INFILTRATION; PERINEURAL PRN
Status: DISCONTINUED | OUTPATIENT
Start: 2025-08-13 | End: 2025-08-13 | Stop reason: ALTCHOICE

## 2025-08-13 RX ADMIN — PROPOFOL 30 MG: 10 INJECTION, EMULSION INTRAVENOUS at 11:13

## 2025-08-13 RX ADMIN — PROPOFOL 30 MG: 10 INJECTION, EMULSION INTRAVENOUS at 11:19

## 2025-08-13 RX ADMIN — PROPOFOL 20 MG: 10 INJECTION, EMULSION INTRAVENOUS at 11:31

## 2025-08-13 RX ADMIN — SODIUM CHLORIDE: 9 INJECTION, SOLUTION INTRAVENOUS at 11:06

## 2025-08-13 RX ADMIN — LIDOCAINE HYDROCHLORIDE 100 MG: 20 INJECTION, SOLUTION EPIDURAL; INFILTRATION; INTRACAUDAL; PERINEURAL at 11:12

## 2025-08-13 RX ADMIN — FENTANYL CITRATE 25 MCG: 50 INJECTION, SOLUTION INTRAMUSCULAR; INTRAVENOUS at 11:26

## 2025-08-13 RX ADMIN — FENTANYL CITRATE 50 MCG: 50 INJECTION, SOLUTION INTRAMUSCULAR; INTRAVENOUS at 11:12

## 2025-08-13 RX ADMIN — FENTANYL CITRATE 25 MCG: 50 INJECTION, SOLUTION INTRAMUSCULAR; INTRAVENOUS at 11:21

## 2025-08-13 RX ADMIN — WATER 2000 MG: 1 INJECTION INTRAMUSCULAR; INTRAVENOUS; SUBCUTANEOUS at 11:19

## 2025-08-13 ASSESSMENT — PAIN - FUNCTIONAL ASSESSMENT: PAIN_FUNCTIONAL_ASSESSMENT: 0-10

## (undated) DEVICE — STERILE COTTON BALLS LARGE 5/P: Brand: MEDLINE

## (undated) DEVICE — SUTURE MCRYL SZ 4-0 L18IN ABSRB UD P-3 L13MM 3/8 CIR PRIM Y494G

## (undated) DEVICE — BANDAGE GZE STR LF 4 1/2X4.1YDS (100/CS)

## (undated) DEVICE — COTTON BALL ST

## (undated) DEVICE — SPONGE GZ W4XL4IN COT 12 PLY TYP VII WVN C FLD DSGN

## (undated) DEVICE — Device

## (undated) DEVICE — GLOVE ORANGE PI 7   MSG9070

## (undated) DEVICE — PAD,ABDOMINAL,5"X9",ST,LF,25/BX: Brand: MEDLINE INDUSTRIES, INC.

## (undated) DEVICE — APPLICATOR MEDICATED 26 CC SOLUTION CLR STRL CHLORAPREP

## (undated) DEVICE — SUTURE MONOCRYL SZ 4-0 L18IN ABSRB UD P-3 L13MM 3/8 CIR PRIM Y494G

## (undated) DEVICE — BANDAGE,GAUZE,4.5"X4.1YD,STERILE,LF: Brand: MEDLINE

## (undated) DEVICE — PACK PROCEDURE SURG PLAS SC MIN SRHP LF

## (undated) DEVICE — GLOVE SURG SZ 8 L11.77IN FNGR THK9.8MIL STRW LTX POLYMER